# Patient Record
Sex: FEMALE | Race: WHITE | NOT HISPANIC OR LATINO | Employment: FULL TIME | URBAN - METROPOLITAN AREA
[De-identification: names, ages, dates, MRNs, and addresses within clinical notes are randomized per-mention and may not be internally consistent; named-entity substitution may affect disease eponyms.]

---

## 2017-05-10 NOTE — PROGRESS NOTES
"  SUBJECTIVE:                                                    Cornelia Mauricio is a 36 year old female who presents to clinic today for the following health issues:      Back Pain      Duration: flared up in February , initially was just annoying now progressing to muscle spasm, her back \"seizes up\" is very painful her area of pain is directly overlying her fusion is worrisome to the patient        Specific cause: none    Description:   Location of pain: low back center  Character of pain: dull ache, \"seizing up/tight\" and constant  Pain radiation:none  New numbness or weakness in legs, not attributed to pain:  no , no leg symptoms at all    Intensity: Currently 2/10, at worst would rate it 8/10 towards evening    History:   Pain interferes with job: No  History of back problems: YES, had fusion L5-s1 in Sept. 2011  Any previous MRI or X-rays: Yes- at [facility].    Sees a specialist for back pain:  Not currently previously was seeing Dr. Ayala  Therapies tried without relief: working out/stretching, massage chair    Alleviating factors:   Improved by: old Rx of soma helped her sleep , she also admits to have a few drinks each night to relax her so she can sleep     Precipitating factors:  Worsened by: Sitting    Functional and Psychosocial Screen (Blair STarT Back):      Not performed today       Accompanying Signs & Symptoms:  Risk of Fracture:  None  Risk of Cauda Equina:  No Unexplained bowel or bladder changes  Risk of Infection:  None  Risk of Cancer:  None  Risk of Ankylosing Spondylitis:  Onset at age <35, male, AND morning back stiffness. no         She is active, she does yoga and swimming though it's not been helpful. She keeps waiting for this to improve but it's not getting better. She wonders if she should see a spine specialist again or what she should do.     If time would like to discuss hip impingement-had seen PT for this but never got better with it and PT suggested to have this reassessed, she " "wondered if it was related to her back.  She is not certain as her hip impingement has been ongoing for a year and her back is just been more bothersome since February         Problem list and histories reviewed & adjusted, as indicated.  Additional history: as documented    BP Readings from Last 3 Encounters:   05/17/17 98/66   10/14/16 120/60   08/31/16 108/66    Wt Readings from Last 3 Encounters:   05/17/17 148 lb (67.1 kg)   10/14/16 155 lb (70.3 kg)   03/18/16 160 lb (72.6 kg)                  Labs reviewed in EPIC    Reviewed and updated as needed this visit by clinical staff       Reviewed and updated as needed this visit by Provider         ROS:  As above    OBJECTIVE:                                                    BP 98/66  Pulse 72  Temp 98.4  F (36.9  C) (Oral)  Resp 12  Ht 5' 3\" (1.6 m)  Wt 148 lb (67.1 kg)  BMI 26.22 kg/m2  Body mass index is 26.22 kg/(m^2).  GENERAL: healthy, alert and no distress  Comprehensive back pain exam:  No tenderness, Range of motion not limited by pain, Lower extremity strength functional and equal on both sides, Diminished paterllar reflex on  right side relative to contralateral side; possible L4 spinal nerve root involvement and Straight leg raise negative bilaterally    Diagnostic Test Results:  Xray - no acute fractures or issues with hardware noted official report pending     ASSESSMENT/PLAN:                                                            1. Midline low back pain without sciatica, unspecified chronicity  We will have her see spine specialty, she asked for tramadol as this has worked for her in the past. She will use this sparingly after trying ibuprofen without benefit. She will stop using her old stoma. She will stop using alcohol. She knows she may not use alcohol with either meds that I have prescribed or in combination with any prescription medication  - traMADol (ULTRAM) 50 MG tablet; Take 1 tablet (50 mg) by mouth every 6 hours as needed for " pain maximum 2 tablet(s) per day  Dispense: 30 tablet; Refill: 0  - cyclobenzaprine (FLEXERIL) 10 MG tablet; Take 1 tablet (10 mg) by mouth 3 times daily as needed for muscle spasms  Dispense: 30 tablet; Refill: 1  - XR Lumbar Spine 2/3 Views; Future  - NEUROSURGERY REFERRAL        Lyly Zayas PA-C  [facility]  Electronically signed by Lyly Zayas PA-C

## 2017-05-17 ENCOUNTER — RADIANT APPOINTMENT (OUTPATIENT)
Dept: GENERAL RADIOLOGY | Facility: OTHER | Age: 37
End: 2017-05-17
Attending: PHYSICIAN ASSISTANT
Payer: COMMERCIAL

## 2017-05-17 ENCOUNTER — OFFICE VISIT (OUTPATIENT)
Dept: FAMILY MEDICINE | Facility: OTHER | Age: 37
End: 2017-05-17
Payer: COMMERCIAL

## 2017-05-17 VITALS
RESPIRATION RATE: 12 BRPM | DIASTOLIC BLOOD PRESSURE: 66 MMHG | TEMPERATURE: 98.4 F | WEIGHT: 148 LBS | HEART RATE: 72 BPM | HEIGHT: 63 IN | SYSTOLIC BLOOD PRESSURE: 98 MMHG | BODY MASS INDEX: 26.22 KG/M2

## 2017-05-17 DIAGNOSIS — M54.50 MIDLINE LOW BACK PAIN WITHOUT SCIATICA, UNSPECIFIED CHRONICITY: ICD-10-CM

## 2017-05-17 DIAGNOSIS — M54.50 MIDLINE LOW BACK PAIN WITHOUT SCIATICA, UNSPECIFIED CHRONICITY: Primary | ICD-10-CM

## 2017-05-17 PROCEDURE — 72100 X-RAY EXAM L-S SPINE 2/3 VWS: CPT

## 2017-05-17 PROCEDURE — 99213 OFFICE O/P EST LOW 20 MIN: CPT | Performed by: PHYSICIAN ASSISTANT

## 2017-05-17 RX ORDER — CYCLOBENZAPRINE HCL 10 MG
10 TABLET ORAL 3 TIMES DAILY PRN
Qty: 30 TABLET | Refills: 1 | Status: SHIPPED | OUTPATIENT
Start: 2017-05-17 | End: 2018-06-27

## 2017-05-17 RX ORDER — TRAMADOL HYDROCHLORIDE 50 MG/1
50 TABLET ORAL EVERY 6 HOURS PRN
Qty: 30 TABLET | Refills: 0 | Status: SHIPPED | OUTPATIENT
Start: 2017-05-17 | End: 2018-06-27

## 2017-05-17 ASSESSMENT — PAIN SCALES - GENERAL: PAINLEVEL: MILD PAIN (2)

## 2017-05-17 NOTE — NURSING NOTE
"Chief Complaint   Patient presents with     Back Pain     Panel Management     utd        Initial BP 98/66  Pulse 72  Temp 98.4  F (36.9  C) (Oral)  Resp 12  Ht 5' 3\" (1.6 m)  Wt 148 lb (67.1 kg)  BMI 26.22 kg/m2 Estimated body mass index is 26.22 kg/(m^2) as calculated from the following:    Height as of this encounter: 5' 3\" (1.6 m).    Weight as of this encounter: 148 lb (67.1 kg).  Medication Reconciliation: complete     Kiara Dorsey CMA (AAMA)      "

## 2017-05-17 NOTE — MR AVS SNAPSHOT
After Visit Summary   5/17/2017    Cornelia Mauricio    MRN: [id number]           Patient Information     Date Of Birth          1980        Visit Information        Provider Department      5/17/2017 2:45 PM Lyly Zayas PA-C [facility] [place]        Today's Diagnoses     Midline low back pain without sciatica, unspecified chronicity    -  1       Follow-ups after your visit        Additional Services     NEUROSURGERY REFERRAL       Your provider has referred you to: [facility]: [facility] -  Neurosurgery Clinic [phone]   [email or web address]    Please be aware that coverage of these services is subject to the terms and limitations of your health insurance plan.  Call member services at your health plan with any benefit or coverage questions.      Please bring the following with you to your appointment:    (1) Any X-Rays, CTs or MRIs which have been performed.  Contact the facility where they were done to arrange for  prior to your scheduled appointment.   (2) List of current medications  (3) This referral request   (4) Any documents/labs given to you for this referral                  Who to contact     If you have questions or need follow up information about today's clinic visit or your schedule please contact [facility] directly at [phone].  Normal or non-critical lab and imaging results will be communicated to you by MyChart, letter or phone within 4 business days after the clinic has received the results. If you do not hear from us within 7 days, please contact the clinic through MyChart or phone. If you have a critical or abnormal lab result, we will notify you by phone as soon as possible.  Submit refill requests through [employer] or call your pharmacy and they will forward the refill request to us. Please allow 3 business days for your refill to be completed.          Additional Information About Your  "Visit        [employer] Information     [employer] gives you secure access to your electronic health record. If you see a primary care provider, you can also send messages to your care team and make appointments. If you have questions, please call your primary care clinic.  If you do not have a primary care provider, please call [phone] and they will assist you.        Care EveryWhere ID     This is your Care EveryWhere ID. This could be used by other organizations to access your [facility] medical records  [id number]        Your Vitals Were     Pulse Temperature Respirations Height BMI (Body Mass Index)       72 98.4  F (36.9  C) (Oral) 12 5' 3\" (1.6 m) 26.22 kg/m2        Blood Pressure from Last 3 Encounters:   05/17/17 98/66   10/14/16 120/60   08/31/16 108/66    Weight from Last 3 Encounters:   05/17/17 148 lb (67.1 kg)   10/14/16 155 lb (70.3 kg)   03/18/16 160 lb (72.6 kg)              We Performed the Following     NEUROSURGERY REFERRAL          Today's Medication Changes          These changes are accurate as of: 5/17/17  3:27 PM.  If you have any questions, ask your nurse or doctor.               Start taking these medicines.        Dose/Directions    cyclobenzaprine 10 MG tablet   Commonly known as:  FLEXERIL   Used for:  Midline low back pain without sciatica, unspecified chronicity   Started by:  Lyly Zayas PA-C        Dose:  10 mg   Take 1 tablet (10 mg) by mouth 3 times daily as needed for muscle spasms   Quantity:  30 tablet   Refills:  1       traMADol 50 MG tablet   Commonly known as:  ULTRAM   Used for:  Midline low back pain without sciatica, unspecified chronicity   Started by:  Lyly Zayas PA-C        Dose:  50 mg   Take 1 tablet (50 mg) by mouth every 6 hours as needed for pain maximum 2 tablet(s) per day   Quantity:  30 tablet   Refills:  0            Where to get your medicines      These medications were sent to [facility] [address] [place] [address] [place] - [address]  [address], " [place] [address]     Phone:  [phone]     cyclobenzaprine 10 MG tablet         Some of these will need a paper prescription and others can be bought over the counter.  Ask your nurse if you have questions.     Bring a paper prescription for each of these medications     traMADol 50 MG tablet                Primary Care Provider Office Phone # Fax #    Lyly Zayas PA-C [phone]       [facility] [address] [place] [address]        Thank you!     Thank you for choosing [facility]  for your care. Our goal is always to provide you with excellent care. Hearing back from our patients is one way we can continue to improve our services. Please take a few minutes to complete the written survey that you may receive in the mail after your visit with us. Thank you!             Your Updated Medication List - Protect others around you: Learn how to safely use, store and throw away your medicines at www.disposemymeds.org.          This list is accurate as of: 5/17/17  3:27 PM.  Always use your most recent med list.                   Brand Name Dispense Instructions for use    cyclobenzaprine 10 MG tablet    FLEXERIL    30 tablet    Take 1 tablet (10 mg) by mouth 3 times daily as needed for muscle spasms       glucosamine 500 MG Caps      Take 1 tablet by mouth daily       KRILL OIL PO      Take 1 capsule by mouth daily       meloxicam 15 MG tablet    MOBIC    30 tablet    Take 1 tablet (15 mg) by mouth daily       MULTIVITAMIN ADULT PO          traMADol 50 MG tablet    ULTRAM    30 tablet    Take 1 tablet (50 mg) by mouth every 6 hours as needed for pain maximum 2 tablet(s) per day       triamcinolone 0.1 % cream    KENALOG    30 g    aaa bid prn

## 2017-06-15 ENCOUNTER — OFFICE VISIT (OUTPATIENT)
Dept: NEUROSURGERY | Facility: CLINIC | Age: 37
End: 2017-06-15
Attending: PHYSICIAN ASSISTANT
Payer: COMMERCIAL

## 2017-06-15 VITALS — WEIGHT: 149 LBS | TEMPERATURE: 97.8 F | BODY MASS INDEX: 26.4 KG/M2 | HEIGHT: 63 IN

## 2017-06-15 DIAGNOSIS — G89.29 CHRONIC MIDLINE LOW BACK PAIN WITHOUT SCIATICA: Primary | ICD-10-CM

## 2017-06-15 DIAGNOSIS — M54.50 CHRONIC MIDLINE LOW BACK PAIN WITHOUT SCIATICA: Primary | ICD-10-CM

## 2017-06-15 PROCEDURE — 99204 OFFICE O/P NEW MOD 45 MIN: CPT | Performed by: PHYSICIAN ASSISTANT

## 2017-06-15 ASSESSMENT — PAIN SCALES - GENERAL: PAINLEVEL: NO PAIN (1)

## 2017-06-15 NOTE — MR AVS SNAPSHOT
After Visit Summary   6/15/2017    Cornelia Mauricio    MRN: [id number]           Patient Information     Date Of Birth          1980        Visit Information        Provider Department      6/15/2017 3:00 PM Jan Pimentel PA-C [facility]        Today's Diagnoses     Chronic midline low back pain without sciatica    -  1       Follow-ups after your visit        Follow-up notes from your care team     Return for after MRI.      Future tests that were ordered for you today     Open Future Orders        Priority Expected Expires Ordered    MR Lumbar Spine w/o & w Contrast Routine  6/15/2018 6/15/2017            Who to contact     If you have questions or need follow up information about today's clinic visit or your schedule please contact [facility] directly at [phone].  Normal or non-critical lab and imaging results will be communicated to you by MyChart, letter or phone within 4 business days after the clinic has received the results. If you do not hear from us within 7 days, please contact the clinic through MyChart or phone. If you have a critical or abnormal lab result, we will notify you by phone as soon as possible.  Submit refill requests through [employer] or call your pharmacy and they will forward the refill request to us. Please allow 3 business days for your refill to be completed.          Additional Information About Your Visit        MyChart Information     [employer] gives you secure access to your electronic health record. If you see a primary care provider, you can also send messages to your care team and make appointments. If you have questions, please call your primary care clinic.  If you do not have a primary care provider, please call [phone] and they will assist you.        Care EveryWhere ID     This is your Care EveryWhere ID. This could be used by other organizations to access your [facility] medical records  [id number]        Your  "Vitals Were     Temperature Height BMI (Body Mass Index)             97.8  F (36.6  C) (Temporal) 1.6 m (5' 3\") 26.39 kg/m2          Blood Pressure from Last 3 Encounters:   05/17/17 98/66   10/14/16 120/60   08/31/16 108/66    Weight from Last 3 Encounters:   06/15/17 67.6 kg (149 lb)   05/17/17 67.1 kg (148 lb)   10/14/16 70.3 kg (155 lb)               Primary Care Provider Office Phone # Fax #    Lyly Zayas PA-C [phone]       [facility] [address] [place] [address]        Thank you!     Thank you for choosing [facility]  for your care. Our goal is always to provide you with excellent care. Hearing back from our patients is one way we can continue to improve our services. Please take a few minutes to complete the written survey that you may receive in the mail after your visit with us. Thank you!             Your Updated Medication List - Protect others around you: Learn how to safely use, store and throw away your medicines at www.disposemymeds.org.          This list is accurate as of: 6/15/17  4:01 PM.  Always use your most recent med list.                   Brand Name Dispense Instructions for use    cyclobenzaprine 10 MG tablet    FLEXERIL    30 tablet    Take 1 tablet (10 mg) by mouth 3 times daily as needed for muscle spasms       glucosamine 500 MG Caps      Take 1 tablet by mouth daily       KRILL OIL PO      Take 1 capsule by mouth daily       meloxicam 15 MG tablet    MOBIC    30 tablet    Take 1 tablet (15 mg) by mouth daily       MULTIVITAMIN ADULT PO          traMADol 50 MG tablet    ULTRAM    30 tablet    Take 1 tablet (50 mg) by mouth every 6 hours as needed for pain maximum 2 tablet(s) per day       triamcinolone 0.1 % cream    KENALOG    30 g    aaa bid prn         "

## 2017-06-15 NOTE — PROGRESS NOTES
"Cornelia Mauricio is a 36 year old female who presents for:  Chief Complaint   Patient presents with     Consult     Mid to low back pain        Initial Vitals:  Temp 97.8  F (36.6  C) (Temporal)  Ht 1.6 m (5' 3\")  Wt 67.6 kg (149 lb)  BMI 26.39 kg/m2 Estimated body mass index is 26.39 kg/(m^2) as calculated from the following:    Height as of this encounter: 1.6 m (5' 3\").    Weight as of this encounter: 67.6 kg (149 lb).. Body surface area is 1.73 meters squared. BP completed using cuff size: NA (Not Taken)  No Pain (1)    Do you feel safe in your environment?  Yes  Do you need any refills today? No    Nursing Comments:         Gloria Melendez CMA ([facility])    "

## 2017-06-15 NOTE — PROGRESS NOTES
Dr. Ethan Viramontes  [facility]  Neurosurgery Clinic Visit      CC: Back pain    Primary care Provider: Lyly Zayas      Reason For Visit:   I was asked to consult on the patient for back pain.      HPI: Cornelia Mauricio is a 36 year old female who presents for evaluation of her chief complaint of low back pain. She did undergo an L5-S1 anterior interbody fusion in . She initially did very well after this. However, she began developing midline low back pain several months ago, just at the superior aspect of her incision. She denies any radicular pain or paresthesias. She denies any bowel or bladder changes.    Past Medical History:   Diagnosis Date     Depressive disorder      Esophageal reflux      Suicide and self-inflicted poisoning by drug or medicinal substance (H)     tylenol OD age 17 or 18     Vaginismus        Past Medical History reviewed with patient during visit.    Past Surgical History:   Procedure Laterality Date     ABDOMEN SURGERY   &     . Spinal surgery     BACK SURGERY       C  DELIVERY ONLY      , Low Cervical     ESOPHAGOSCOPY, GASTROSCOPY, DUODENOSCOPY (EGD), COMBINED  2011    Procedure:COMBINED ESOPHAGOSCOPY, GASTROSCOPY, DUODENOSCOPY (EGD), BIOPSY SINGLE OR MULTIPLE; multiple; Surgeon:JEFFRY BE; Location:[facility]     Past Surgical History reviewed with patient during visit.    Current Outpatient Prescriptions   Medication     traMADol (ULTRAM) 50 MG tablet     cyclobenzaprine (FLEXERIL) 10 MG tablet     Multiple Vitamins-Minerals (MULTIVITAMIN ADULT PO)     meloxicam (MOBIC) 15 MG tablet     glucosamine 500 MG CAPS     KRILL OIL PO     triamcinolone (KENALOG) 0.1 % cream     No current facility-administered medications for this visit.        Allergies   Allergen Reactions     No Known Drug Allergies        Social History     Social History     Marital status:      Spouse name: N/A     Number of children: N/A      "Years of education: N/A     Social History Main Topics     Smoking status: Never Smoker     Smokeless tobacco: Never Used     Alcohol use Yes     Drug use: No     Sexual activity: Yes     Partners: Male     Birth control/ protection: Male Surgical      Comment:  has vas     Other Topics Concern     Parent/Sibling W/ Cabg, Mi Or Angioplasty Before 65f 55m? No     Social History Narrative       Family History   Problem Relation Age of Onset     Obesity Mother      GASTROINTESTINAL DISEASE Mother      Gallbladder     Obesity Sister      Psychotic Disorder Sister      Multi personality, bipolar, suicidal, compulsive, anixety     Depression Sister      GASTROINTESTINAL DISEASE Sister      gallbladder     DIABETES Father      Alcohol/Drug Father      alcohol     Depression Father      Breast Cancer Paternal Grandmother       age 70, unknown why     Eye Disorder Maternal Grandmother      macular degeneration     Depression Maternal Grandmother      Gynecology Maternal Grandmother      Hysterectomy - Tumors, cysts     GASTROINTESTINAL DISEASE Maternal Grandmother      Gallbladder     C.A.D. Maternal Grandfather      Cardiovascular Maternal Grandfather      HEART DISEASE Maternal Grandfather      MI     Genitourinary Problems Paternal Grandfather      Kidney Failure     CANCER Paternal Grandfather      Neurologic Disorder Daughter      adhd     Musculoskeletal Disorder Paternal Aunt      fibromyalgias          ROS: 10 point ROS neg other than the symptoms noted above in the HPI.    Vital Signs: Temp 97.8  F (36.6  C) (Temporal)  Ht 1.6 m (5' 3\")  Wt 67.6 kg (149 lb)  BMI 26.39 kg/m2    Examination:  Constitutional:  Alert, well nourished, NAD.  HEENT: Normocephalic, atraumatic.   Pulmonary:  Without shortness of breath, normal effort.   Lymph: no lymphadenopathy to low back or LE.   Integumentary: Skin is free of rashes or lesions.   Cardiovascular:  No pitting edema of BLE.  "     Neurological:  Awake  Alert  Oriented x 3  Speech clear  Cranial nerves II - XII grossly intact  PERRL  EOMI  Face symmetric  Tongue midline  Motor exam   Shoulder Abduction:  Right:  5/5   Left:  5/5  Biceps:                      Right:  5/5   Left:  5/5  Triceps:                     Right:  5/5   Left:  5/5  Wrist Extensors:       Right:  5/5   Left:  5/5  Wrist Flexors:           Right:  5/5   Left:  5/5  Intrinsics:                   Right:  5/5   Left:  5/5  Hip Flexor:                Right: 5/5  Left:  5/5  Hip Adductor:             Right:  5/5  Left:  5/5  Hip Abductor:             Right:  5/5  Left:  5/5  Gastroc Soleus:        Right:  5/5  Left:  5/5  Tib/Ant:                      Right:  5/5  Left:  5/5  EHL:                          Right:  5/5  Left:  5/5       Sensation normal to bilateral upper and lower extremities.    Reflexes are 2+ in the patellar and Achilles. There is no clonus. Downgoing Babinski.    Musculoskeletal:  Gait: Able to stand from a seated position. Normal non-antalgic, non-myelopathic gait.  Able to heel/toe walk without loss of balance    Lumbar examination reveals no tenderness of the spine or paraspinous muscles.  Hip height is symmetrical. Negative SI joint, sciatic notch or greater trochanteric tenderness to palpation bilaterally.  Straight leg raise is negative bilaterally.      Imaging:   None    Assessment/Plan:    Low back pain  Prior L5-S1 anterior fusion in 2011 with [place] [facility]      Cornelia Mauricio is a 36 year old female. I did have a discussion with the patient regarding her symptoms. She has developed midline low back pain, just superior to her old incision. She did have an anterior fusion at L5-S1 6 years ago. She does have x-rays that were done about 1 month ago, which show stable hardware. We will obtain a lumbar spine MRI to further evaluate.          Jan Pimentel PA-C  [facility]  [address]  [address]  [place] [address]    Tel [phone]  Pager [phone]

## 2017-06-21 ENCOUNTER — HOSPITAL ENCOUNTER (OUTPATIENT)
Dept: MRI IMAGING | Facility: CLINIC | Age: 37
Discharge: HOME | End: 2017-06-21
Attending: PHYSICIAN ASSISTANT | Admitting: PHYSICIAN ASSISTANT
Payer: COMMERCIAL

## 2017-06-21 DIAGNOSIS — G89.29 CHRONIC MIDLINE LOW BACK PAIN WITHOUT SCIATICA: ICD-10-CM

## 2017-06-21 DIAGNOSIS — M54.50 CHRONIC MIDLINE LOW BACK PAIN WITHOUT SCIATICA: ICD-10-CM

## 2017-06-21 PROCEDURE — 72158 MRI LUMBAR SPINE W/O & W/DYE: CPT

## 2017-06-21 PROCEDURE — 25000128 H RX IP 250 OP 636: Performed by: RADIOLOGY

## 2017-06-21 PROCEDURE — A9585 GADOBUTROL INJECTION: HCPCS | Performed by: RADIOLOGY

## 2017-06-21 RX ORDER — GADOBUTROL 604.72 MG/ML
7.5 INJECTION INTRAVENOUS ONCE
Status: COMPLETED | OUTPATIENT
Start: 2017-06-21 | End: 2017-06-21

## 2017-06-21 RX ADMIN — GADOBUTROL 7.5 ML: 604.72 INJECTION INTRAVENOUS at 16:36

## 2017-08-09 ENCOUNTER — MYC MEDICAL ADVICE (OUTPATIENT)
Dept: NEUROSURGERY | Facility: CLINIC | Age: 37
End: 2017-08-09

## 2017-08-09 DIAGNOSIS — M54.50 CHRONIC MIDLINE LOW BACK PAIN WITHOUT SCIATICA: Primary | ICD-10-CM

## 2017-08-09 DIAGNOSIS — G89.29 CHRONIC MIDLINE LOW BACK PAIN WITHOUT SCIATICA: Primary | ICD-10-CM

## 2017-08-09 NOTE — TELEPHONE ENCOUNTER
Per Jan Pimentel PA-C, patient's lumbar MRI looks good. No concern with prior L5-S1 fusion or stenosis. He recc PT and pain mgmt referral. Spoke with patient, and she'd like to proceed with PT first. She wants the referral mailed to her home address because her job offers PT and she can take the order there. She will call back if she decides to proceed with a pain mgmt clinic. Advised patient to contact our clinic with any questions or concerns.

## 2018-04-12 ENCOUNTER — OFFICE VISIT (OUTPATIENT)
Dept: ORTHOPEDICS | Facility: OTHER | Age: 38
End: 2018-04-12
Payer: COMMERCIAL

## 2018-04-12 ENCOUNTER — OFFICE VISIT (OUTPATIENT)
Dept: OBGYN | Facility: OTHER | Age: 38
End: 2018-04-12
Payer: COMMERCIAL

## 2018-04-12 ENCOUNTER — RADIANT APPOINTMENT (OUTPATIENT)
Dept: GENERAL RADIOLOGY | Facility: OTHER | Age: 38
End: 2018-04-12
Attending: ORTHOPAEDIC SURGERY
Payer: COMMERCIAL

## 2018-04-12 VITALS
WEIGHT: 158.25 LBS | DIASTOLIC BLOOD PRESSURE: 74 MMHG | HEART RATE: 80 BPM | SYSTOLIC BLOOD PRESSURE: 114 MMHG | BODY MASS INDEX: 28.03 KG/M2

## 2018-04-12 VITALS
DIASTOLIC BLOOD PRESSURE: 60 MMHG | BODY MASS INDEX: 27.29 KG/M2 | SYSTOLIC BLOOD PRESSURE: 110 MMHG | TEMPERATURE: 97.8 F | WEIGHT: 154 LBS | HEIGHT: 63 IN

## 2018-04-12 DIAGNOSIS — M25.552 BILATERAL HIP PAIN: ICD-10-CM

## 2018-04-12 DIAGNOSIS — M25.851 HIP IMPINGEMENT SYNDROME, RIGHT: ICD-10-CM

## 2018-04-12 DIAGNOSIS — Z30.013 ENCOUNTER FOR INITIAL PRESCRIPTION OF INJECTABLE CONTRACEPTIVE: Primary | ICD-10-CM

## 2018-04-12 DIAGNOSIS — Z30.09 CONTRACEPTIVE EDUCATION: ICD-10-CM

## 2018-04-12 DIAGNOSIS — M25.852 HIP IMPINGEMENT SYNDROME, LEFT: Primary | ICD-10-CM

## 2018-04-12 DIAGNOSIS — M25.551 BILATERAL HIP PAIN: ICD-10-CM

## 2018-04-12 PROCEDURE — 73523 X-RAY EXAM HIPS BI 5/> VIEWS: CPT

## 2018-04-12 PROCEDURE — 99213 OFFICE O/P EST LOW 20 MIN: CPT | Performed by: ORTHOPAEDIC SURGERY

## 2018-04-12 PROCEDURE — 99203 OFFICE O/P NEW LOW 30 MIN: CPT | Mod: 25 | Performed by: ADVANCED PRACTICE MIDWIFE

## 2018-04-12 PROCEDURE — 96372 THER/PROPH/DIAG INJ SC/IM: CPT | Performed by: ADVANCED PRACTICE MIDWIFE

## 2018-04-12 RX ORDER — MEDROXYPROGESTERONE ACETATE 150 MG/ML
150 INJECTION, SUSPENSION INTRAMUSCULAR
Qty: 3 ML | Refills: 3 | OUTPATIENT
Start: 2018-04-12 | End: 2018-06-27

## 2018-04-12 ASSESSMENT — PAIN SCALES - GENERAL: PAINLEVEL: MILD PAIN (3)

## 2018-04-12 NOTE — LETTER
"    4/12/2018         RE: Cornelia Mauricio  [address]  [place] [address]        Dear Colleague,    Thank you for referring your patient, Cornelia Mauricio, to the [facility]. Please see a copy of my visit note below.    Office Visit-Follow up    Chief Complaint: Cornelia Mauricio is a 37 year old female who is being seen for   Chief Complaint   Patient presents with     Musculoskeletal Problem     bilateral hip pain       History of Present Illness:   Presents with about 2 years of groin pain.  On and off.  She was active with exercise.  She was doing a lunge when she felt that deep groin pain in her left hip initially.  She rested and slightly improved.  However then the pain came to her right hip.  It has been on and off however more constant as of late.  She is attempted rest, activity modifications.  She is done over 3 months worth of formal physical therapy with no significant improvement.  She has pain with walking and on elliptical.      REVIEW OF SYSTEMS  General: negative for, night sweats, dizziness, fatigue  Resp: No shortness of breath and no cough  CV: negative for chest pain, syncope or near-syncope  GI: negative for nausea, vomiting and diarrhea  : negative for dysuria and hematuria  Musculoskeletal: as above  Neurologic: negative for syncope   Hematologic: negative for bleeding disorder    Physical Exam:  Vitals: /60 (BP Location: Left arm, Patient Position: Sitting, Cuff Size: Adult Large)  Temp 97.8  F (36.6  C) (Temporal)  Ht 5' 3\" (1.6 m)  Wt 154 lb (69.9 kg)  LMP 03/26/2018 (Exact Date)  BMI 27.28 kg/m2  BMI= Body mass index is 27.28 kg/(m^2).  Constitutional: healthy, alert and no acute distress   Psychiatric: mentation appears normal and affect normal/bright  NEURO: no focal deficits  RESP: Normal with easy respirations and no use of accessory muscles to breathe, no audible wheezing or retractions  CV: bilateral lower extremity:   no edema         SKIN: No " erythema, rashes, excoriation, or breakdown. No evidence of infection.   JOINT/EXTREMITIES:bilateral hips: No focal areas of tenderness.  She has full active range of motion.  No significant weakness.  Well-developed musculature.  No gross deformity.  Negative straight leg.  Negative Cecil.  Positive impingement finding with flexion internal rotation.  Negative Pam.  No significant hamstring tightness.  GAIT: non-antalgic            Diagnostic Modalities:  bilateral hip X-ray: No fractures or dislocations.  No significant arthritis.  Previous lumbar spine instrumentation.  Slight cystic change on the femoral head neck junction.  Independent visualization of the images was performed.      Impression: bilateral hips femoral acetabular impingement    Plan:  All of the above pertinent physical exam and imaging modalities findings was reviewed with Cornelia.    Options discussed.  At this point I recommended attempting some conservative care including intra-articular steroid injections under fluoroscopy by radiology.  I reviewed her pathology.  Recommend she keep her activity to low impact.  Continue physical therapy exercises.  If this fails to provide her relief I would recommend bilateral hip MRI arthrograms for full evaluation of her labral tissue.        Return to clinic PRN, or sooner as needed for changes.  Re-x-ray on return: No    Nehemiah Hoffman D.O.          Again, thank you for allowing me to participate in the care of your patient.        Sincerely,        Leo Hoffman, DO

## 2018-04-12 NOTE — NURSING NOTE
"Chief Complaint   Patient presents with     Musculoskeletal Problem     bilateral hip pain       Initial /60 (BP Location: Left arm, Patient Position: Sitting, Cuff Size: Adult Large)  Temp 97.8  F (36.6  C) (Temporal)  Ht 1.6 m (5' 3\")  Wt 69.9 kg (154 lb)  LMP 03/26/2018 (Exact Date)  BMI 27.28 kg/m2 Estimated body mass index is 27.28 kg/(m^2) as calculated from the following:    Height as of this encounter: 1.6 m (5' 3\").    Weight as of this encounter: 69.9 kg (154 lb).  Medication Reconciliation: complete    "

## 2018-04-12 NOTE — MR AVS SNAPSHOT
After Visit Summary   4/12/2018    Cornelia Mauricio    MRN: [id number]           Patient Information     Date Of Birth          1980        Visit Information        Provider Department      4/12/2018 3:20 PM Leo Hoffman DO [facility]        Today's Diagnoses     Hip impingement syndrome, left    -  1    Hip impingement syndrome, right           Follow-ups after your visit        Future tests that were ordered for you today     Open Future Orders        Priority Expected Expires Ordered    XR Joint Injection Major Bilateral Routine 4/12/2018 4/12/2019 4/12/2018            Who to contact     If you have questions or need follow up information about today's clinic visit or your schedule please contact [facility] directly at [phone].  Normal or non-critical lab and imaging results will be communicated to you by [employer], letter or phone within 4 business days after the clinic has received the results. If you do not hear from us within 7 days, please contact the clinic through [employer] or phone. If you have a critical or abnormal lab result, we will notify you by phone as soon as possible.  Submit refill requests through [employer] or call your pharmacy and they will forward the refill request to us. Please allow 3 business days for your refill to be completed.          Additional Information About Your Visit        MyChart Information     [employer] gives you secure access to your electronic health record. If you see a primary care provider, you can also send messages to your care team and make appointments. If you have questions, please call your primary care clinic.  If you do not have a primary care provider, please call [phone] and they will assist you.        Care EveryWhere ID     This is your Care EveryWhere ID. This could be used by other organizations to access your [place] medical records  [id number]        Your Vitals Were     Temperature  "Height Last Period BMI (Body Mass Index)          97.8  F (36.6  C) (Temporal) 5' 3\" (1.6 m) 03/26/2018 (Exact Date) 27.28 kg/m2         Blood Pressure from Last 3 Encounters:   04/12/18 110/60   04/12/18 114/74   05/17/17 98/66    Weight from Last 3 Encounters:   04/12/18 154 lb (69.9 kg)   04/12/18 158 lb 4 oz (71.8 kg)   06/15/17 149 lb (67.6 kg)                 Today's Medication Changes          These changes are accurate as of 4/12/18  5:06 PM.  If you have any questions, ask your nurse or doctor.               Start taking these medicines.        Dose/Directions    medroxyPROGESTERone 150 MG/ML injection   Commonly known as:  DEPO-PROVERA   Used for:  Encounter for initial prescription of injectable contraceptive, Contraceptive education   Started by:  Gladis Ferris, [place] CNM        Dose:  150 mg   Inject 1 mL (150 mg) into the muscle every 3 months   Quantity:  3 mL   Refills:  3            Where to get your medicines      Some of these will need a paper prescription and others can be bought over the counter.  Ask your nurse if you have questions.     You don't need a prescription for these medications     medroxyPROGESTERone 150 MG/ML injection                Primary Care Provider Office Phone # Fax #    Lyly Zayas PA-C [phone] [address] [place] [address]        Equal Access to Services     [facility] AH: Hadii clint liang hadasho Sogary, waaxda luqadaha, qaybta kaalmada cailinyada, waxay mao frank. So [facility] [phone].    ATENCIÓN: Si habla español, tiene a valladares disposición servicios gratuitos de asistencia lingüística. Llame al [phone].    We comply with applicable federal civil rights laws and [place] laws. We do not discriminate on the basis of race, color, national origin, age, disability, sex, sexual orientation, or gender identity.            Thank you!     Thank you for choosing [facility]  for your care. Our goal " is always to provide you with excellent care. Hearing back from our patients is one way we can continue to improve our services. Please take a few minutes to complete the written survey that you may receive in the mail after your visit with us. Thank you!             Your Updated Medication List - Protect others around you: Learn how to safely use, store and throw away your medicines at www.disposemymeds.org.          This list is accurate as of 4/12/18  5:06 PM.  Always use your most recent med list.                   Brand Name Dispense Instructions for use Diagnosis    cyclobenzaprine 10 MG tablet    FLEXERIL    30 tablet    Take 1 tablet (10 mg) by mouth 3 times daily as needed for muscle spasms    Midline low back pain without sciatica, unspecified chronicity       glucosamine 500 MG Caps      Take 1 tablet by mouth daily        KRILL OIL PO      Take 1 capsule by mouth daily        medroxyPROGESTERone 150 MG/ML injection    DEPO-PROVERA    3 mL    Inject 1 mL (150 mg) into the muscle every 3 months    Encounter for initial prescription of injectable contraceptive, Contraceptive education       meloxicam 15 MG tablet    MOBIC    30 tablet    Take 1 tablet (15 mg) by mouth daily    Strain of left hip adductor muscle, initial encounter       MULTIVITAMIN ADULT PO           traMADol 50 MG tablet    ULTRAM    30 tablet    Take 1 tablet (50 mg) by mouth every 6 hours as needed for pain maximum 2 tablet(s) per day    Midline low back pain without sciatica, unspecified chronicity       triamcinolone 0.1 % cream    KENALOG    30 g    aaa bid prn    Eczema

## 2018-04-12 NOTE — PROGRESS NOTES
"Office Visit-Follow up    Chief Complaint: Cornelia Mauricio is a 37 year old female who is being seen for   Chief Complaint   Patient presents with     Musculoskeletal Problem     bilateral hip pain       History of Present Illness:   Presents with about 2 years of groin pain.  On and off.  She was active with exercise.  She was doing a lunge when she felt that deep groin pain in her left hip initially.  She rested and slightly improved.  However then the pain came to her right hip.  It has been on and off however more constant as of late.  She is attempted rest, activity modifications.  She is done over 3 months worth of formal physical therapy with no significant improvement.  She has pain with walking and on elliptical.      REVIEW OF SYSTEMS  General: negative for, night sweats, dizziness, fatigue  Resp: No shortness of breath and no cough  CV: negative for chest pain, syncope or near-syncope  GI: negative for nausea, vomiting and diarrhea  : negative for dysuria and hematuria  Musculoskeletal: as above  Neurologic: negative for syncope   Hematologic: negative for bleeding disorder    Physical Exam:  Vitals: /60 (BP Location: Left arm, Patient Position: Sitting, Cuff Size: Adult Large)  Temp 97.8  F (36.6  C) (Temporal)  Ht 5' 3\" (1.6 m)  Wt 154 lb (69.9 kg)  LMP 03/26/2018 (Exact Date)  BMI 27.28 kg/m2  BMI= Body mass index is 27.28 kg/(m^2).  Constitutional: healthy, alert and no acute distress   Psychiatric: mentation appears normal and affect normal/bright  NEURO: no focal deficits  RESP: Normal with easy respirations and no use of accessory muscles to breathe, no audible wheezing or retractions  CV: bilateral lower extremity:   no edema         SKIN: No erythema, rashes, excoriation, or breakdown. No evidence of infection.   JOINT/EXTREMITIES:bilateral hips: No focal areas of tenderness.  She has full active range of motion.  No significant weakness.  Well-developed musculature.  No gross " deformity.  Negative straight leg.  Negative Cecil.  Positive impingement finding with flexion internal rotation.  Negative Pam.  No significant hamstring tightness.  GAIT: non-antalgic            Diagnostic Modalities:  bilateral hip X-ray: No fractures or dislocations.  No significant arthritis.  Previous lumbar spine instrumentation.  Slight cystic change on the femoral head neck junction.  Independent visualization of the images was performed.      Impression: bilateral hips femoral acetabular impingement    Plan:  All of the above pertinent physical exam and imaging modalities findings was reviewed with Cornelia.    Options discussed.  At this point I recommended attempting some conservative care including intra-articular steroid injections under fluoroscopy by radiology.  I reviewed her pathology.  Recommend she keep her activity to low impact.  Continue physical therapy exercises.  If this fails to provide her relief I would recommend bilateral hip MRI arthrograms for full evaluation of her labral tissue.        Return to clinic PRN, or sooner as needed for changes.  Re-x-ray on return: No    Nehemiah Hoffman D.O.

## 2018-04-12 NOTE — PROGRESS NOTES
Cornelia Mauricio is a 37 year old who presents to the clinic for discussion of birth control methods.   She has used the following methods in the past: CECILIA, Mirena IUD, Depo Provera and condoms  Today she is interested in discussing Depo Provera.  Has been using condoms 100% with no breaks/leaks  Histories reviewed and updated  Past Medical History:   Diagnosis Date     Depressive disorder      Esophageal reflux      Suicide and self-inflicted poisoning by drug or medicinal substance (H)     tylenol OD age 17 or 18     Vaginismus      Past Surgical History:   Procedure Laterality Date     ABDOMEN SURGERY   &     . Spinal surgery     BACK SURGERY       C  DELIVERY ONLY      , Low Cervical     ESOPHAGOSCOPY, GASTROSCOPY, DUODENOSCOPY (EGD), COMBINED  2011    Procedure:COMBINED ESOPHAGOSCOPY, GASTROSCOPY, DUODENOSCOPY (EGD), BIOPSY SINGLE OR MULTIPLE; multiple; Surgeon:JEFFRY BE; Location: GI     Social History     Social History     Marital status:      Spouse name: N/A     Number of children: N/A     Years of education: N/A     Occupational History     Not on file.     Social History Main Topics     Smoking status: Never Smoker     Smokeless tobacco: Never Used     Alcohol use Yes     Drug use: No     Sexual activity: Yes     Partners: Male     Birth control/ protection: Condom, Male Surgical      Comment:  has vas     Other Topics Concern     Parent/Sibling W/ Cabg, Mi Or Angioplasty Before 65f 55m? No     Social History Narrative     Family History   Problem Relation Age of Onset     Obesity Mother      GASTROINTESTINAL DISEASE Mother      Gallbladder     Obesity Sister      Psychotic Disorder Sister      Multi personality, bipolar, suicidal, compulsive, anixety     Depression Sister      GASTROINTESTINAL DISEASE Sister      gallbladder     DIABETES Father      Alcohol/Drug Father      alcohol     Depression Father      Breast Cancer Paternal  Grandmother       age 70, unknown why     Eye Disorder Maternal Grandmother      macular degeneration     Depression Maternal Grandmother      Gynecology Maternal Grandmother      Hysterectomy - Tumors, cysts     GASTROINTESTINAL DISEASE Maternal Grandmother      Gallbladder     C.A.D. Maternal Grandfather      Cardiovascular Maternal Grandfather      HEART DISEASE Maternal Grandfather      MI     Genitourinary Problems Paternal Grandfather      Kidney Failure     CANCER Paternal Grandfather      Neurologic Disorder Daughter      adhd     Musculoskeletal Disorder Paternal Aunt      fibromyalgias       Menstrual History    Menses every 28 days.  Length of menses: 2 days  Menstrual description: light    Denies the following contraindications to OCP use:  Migraine and migraine with aura  Smoking  Liver disease  Personal and family history of blood clot or stroke under the age of 50.  History of heart disease  History of breast cancer  History of lupus  History of hypertension       ROS: 10 point ROS neg other than the symptoms noted above in the HPI.    EXAM:  /74 (BP Location: Left arm, Patient Position: Chair, Cuff Size: Adult Regular)  Pulse 80  Wt 158 lb 4 oz (71.8 kg)  LMP 2018 (Exact Date)  BMI 28.03 kg/m2          ASSESSMENT/PLAN:  There are no contraindications to the use of Depo Provera    (Z30.013) Encounter for initial prescription of injectable contraceptive  (primary encounter diagnosis)  Comment:   Plan: medroxyPROGESTERone (DEPO-PROVERA) 150 MG/ML         injection            (Z30.09) Contraceptive education  Comment:   Plan: medroxyPROGESTERone (DEPO-PROVERA) 150 MG/ML         injection          We reviewed her contraceptive options to include pills, rings, patches, depo, nexplanon and the IUDs.   Risks, benefits side effects and effectiveness were discussed.   She is a bit concerned about wt gain with the depo but will observe.   Reviewed bleeding profile with depo    Visit  length 30 min with >50% spent in counseling regarding contraceptive options, benefits and risks.   .  Time noted does not include any time required to complete procedures.

## 2018-04-12 NOTE — NURSING NOTE
"Chief Complaint   Patient presents with     Contraception     wants to restart Depo injection       Initial /74 (BP Location: Left arm, Patient Position: Chair, Cuff Size: Adult Regular)  Pulse 80  Wt 158 lb 4 oz (71.8 kg)  LMP 03/26/2018 (Exact Date)  BMI 28.03 kg/m2 Estimated body mass index is 28.03 kg/(m^2) as calculated from the following:    Height as of 6/15/17: 5' 3\" (1.6 m).    Weight as of this encounter: 158 lb 4 oz (71.8 kg).  Medication Reconciliation: complete   Heather Dee CMA      "

## 2018-04-12 NOTE — MR AVS SNAPSHOT
After Visit Summary   4/12/2018    Cornelia Mauricio    MRN: [id number]           Patient Information     Date Of Birth          1980        Visit Information        Provider Department      4/12/2018 2:45 PM Gladis Ferris APRN [facility]        Today's Diagnoses     Encounter for initial prescription of injectable contraceptive    -  1    Contraceptive education           Follow-ups after your visit        Your next 10 appointments already scheduled     Apr 12, 2018  3:15 PM CDT   (Arrive by 3:00 PM)   XR PELVIS AND HIP BILATERAL 2 VIEWS with ERXR[facility]   [facility]    [address]  [place] [address] [phone]           Please bring a list of your current medicines to your exam. (Include vitamins, minerals and over-thecounter medicines.) Leave your valuables at home.  Tell your doctor if there is a chance you may be pregnant.  You do not need to do anything special for this exam.            Apr 12, 2018  3:20 PM CDT   Return Visit with Leo Hoffman,    [facility]    [address]  [address]  [place] [address] [phone]              Future tests that were ordered for you today     Open Future Orders        Priority Expected Expires Ordered    XR Pelvis and Hip Bilateral 2 Views Routine 4/12/2018 4/7/2019 4/6/2018            Who to contact     If you have questions or need follow up information about today's clinic visit or your schedule please contact [facility] directly at [phone].  Normal or non-critical lab and imaging results will be communicated to you by MyChart, letter or phone within 4 business days after the clinic has received the results. If you do not hear from us within 7 days, please contact the clinic through MyChart or phone. If you have a critical or abnormal lab result, we will notify you by phone as soon as  possible.  Submit refill requests through [employer] or call your pharmacy and they will forward the refill request to us. Please allow 3 business days for your refill to be completed.          Additional Information About Your Visit        [email or web address] Information     [employer] gives you secure access to your electronic health record. If you see a primary care provider, you can also send messages to your care team and make appointments. If you have questions, please call your primary care clinic.  If you do not have a primary care provider, please call [phone] and they will assist you.        Care EveryWhere ID     This is your Care EveryWhere ID. This could be used by other organizations to access your [place] medical records  [id number]        Your Vitals Were     Pulse Last Period BMI (Body Mass Index)             80 03/26/2018 (Exact Date) 28.03 kg/m2          Blood Pressure from Last 3 Encounters:   04/12/18 114/74   05/17/17 98/66   10/14/16 120/60    Weight from Last 3 Encounters:   04/12/18 158 lb 4 oz (71.8 kg)   06/15/17 149 lb (67.6 kg)   05/17/17 148 lb (67.1 kg)              Today, you had the following     No orders found for display         Today's Medication Changes          These changes are accurate as of 4/12/18  3:13 PM.  If you have any questions, ask your nurse or doctor.               Start taking these medicines.        Dose/Directions    medroxyPROGESTERone 150 MG/ML injection   Commonly known as:  DEPO-PROVERA   Used for:  Encounter for initial prescription of injectable contraceptive, Contraceptive education   Started by:  Gladis Ferris APRN CNM        Dose:  150 mg   Inject 1 mL (150 mg) into the muscle every 3 months   Quantity:  3 mL   Refills:  3            Where to get your medicines      Some of these will need a paper prescription and others can be bought over the counter.  Ask your nurse if you have questions.     You don't need a prescription for these medications      medroxyPROGESTERone 150 MG/ML injection                Primary Care Provider Office Phone # Fax #    Lyly Zayas PA-C [phone] [address] [place] [address]        Equal Access to Services     [facility] : Hadii clint liang michaelo Sojeali, waaxda luqadaha, qaybta kaalmada adeamrit, eugene kelsey keeedilia boyd laMarycarmenbrenden frank. So [facility] [phone].    ATENCIÓN: Si habla español, tiene a valladares disposición servicios gratuitos de asistencia lingüística. Llame al [phone].    We comply with applicable federal civil rights laws and [place] laws. We do not discriminate on the basis of race, color, national origin, age, disability, sex, sexual orientation, or gender identity.            Thank you!     Thank you for choosing [facility]  for your care. Our goal is always to provide you with excellent care. Hearing back from our patients is one way we can continue to improve our services. Please take a few minutes to complete the written survey that you may receive in the mail after your visit with us. Thank you!             Your Updated Medication List - Protect others around you: Learn how to safely use, store and throw away your medicines at www.disposemymeds.org.          This list is accurate as of 4/12/18  3:13 PM.  Always use your most recent med list.                   Brand Name Dispense Instructions for use Diagnosis    cyclobenzaprine 10 MG tablet    FLEXERIL    30 tablet    Take 1 tablet (10 mg) by mouth 3 times daily as needed for muscle spasms    Midline low back pain without sciatica, unspecified chronicity       glucosamine 500 MG Caps      Take 1 tablet by mouth daily        KRILL OIL PO      Take 1 capsule by mouth daily        medroxyPROGESTERone 150 MG/ML injection    DEPO-PROVERA    3 mL    Inject 1 mL (150 mg) into the muscle every 3 months    Encounter for initial prescription of injectable contraceptive, Contraceptive education       meloxicam 15 MG  tablet    MOBIC    30 tablet    Take 1 tablet (15 mg) by mouth daily    Strain of left hip adductor muscle, initial encounter       MULTIVITAMIN ADULT PO           traMADol 50 MG tablet    ULTRAM    30 tablet    Take 1 tablet (50 mg) by mouth every 6 hours as needed for pain maximum 2 tablet(s) per day    Midline low back pain without sciatica, unspecified chronicity       triamcinolone 0.1 % cream    KENALOG    30 g    aaa bid prn    Eczema

## 2018-04-16 ENCOUNTER — TELEPHONE (OUTPATIENT)
Dept: LAB | Facility: OTHER | Age: 38
End: 2018-04-16

## 2018-04-16 ENCOUNTER — E-VISIT (OUTPATIENT)
Dept: FAMILY MEDICINE | Facility: OTHER | Age: 38
End: 2018-04-16
Payer: COMMERCIAL

## 2018-04-16 DIAGNOSIS — N94.9 VAGINAL SYMPTOM: ICD-10-CM

## 2018-04-16 DIAGNOSIS — N76.0 BV (BACTERIAL VAGINOSIS): Primary | ICD-10-CM

## 2018-04-16 DIAGNOSIS — N94.9 VAGINAL SYMPTOM: Primary | ICD-10-CM

## 2018-04-16 DIAGNOSIS — B96.89 BV (BACTERIAL VAGINOSIS): Primary | ICD-10-CM

## 2018-04-16 LAB
SPECIMEN SOURCE: ABNORMAL
WET PREP SPEC: ABNORMAL

## 2018-04-16 PROCEDURE — 98969 ZZC NONPHYSICIAN ONLINE ASSESSMENT AND MANAGEMENT: CPT | Performed by: PHYSICIAN ASSISTANT

## 2018-04-16 PROCEDURE — 87210 SMEAR WET MOUNT SALINE/INK: CPT | Performed by: PHYSICIAN ASSISTANT

## 2018-04-16 NOTE — TELEPHONE ENCOUNTER
Spoke with pt and informed her that we need to have some kind of office visit in order to prescribe a medication. Pt stated understanding and would like to do mychart visit. Pt will do this as soon as she gets home. Wet prep was ordered as pt did not leave urine sample today. Provider was informed.    Kiara Dorsey CMA ([facility])

## 2018-04-16 NOTE — TELEPHONE ENCOUNTER
Patient was here today to leave a wet prep  For possible yeast. Please place orders as needed. Thank you  Aleksandra BARAHONA) [facility]

## 2018-04-18 RX ORDER — METRONIDAZOLE 500 MG/1
500 TABLET ORAL 2 TIMES DAILY
Qty: 14 TABLET | Refills: 0 | Status: SHIPPED | OUTPATIENT
Start: 2018-04-18 | End: 2018-06-27

## 2018-06-27 ENCOUNTER — OFFICE VISIT (OUTPATIENT)
Dept: FAMILY MEDICINE | Facility: CLINIC | Age: 38
End: 2018-06-27
Payer: COMMERCIAL

## 2018-06-27 VITALS
SYSTOLIC BLOOD PRESSURE: 112 MMHG | TEMPERATURE: 97.4 F | DIASTOLIC BLOOD PRESSURE: 82 MMHG | RESPIRATION RATE: 20 BRPM | BODY MASS INDEX: 28.36 KG/M2 | HEART RATE: 84 BPM | WEIGHT: 160.1 LBS

## 2018-06-27 DIAGNOSIS — F43.23 ADJUSTMENT DISORDER WITH MIXED ANXIETY AND DEPRESSED MOOD: Primary | ICD-10-CM

## 2018-06-27 PROCEDURE — 99214 OFFICE O/P EST MOD 30 MIN: CPT | Performed by: FAMILY MEDICINE

## 2018-06-27 RX ORDER — CITALOPRAM HYDROBROMIDE 20 MG/1
20 TABLET ORAL DAILY
Qty: 30 TABLET | Refills: 1 | Status: SHIPPED | OUTPATIENT
Start: 2018-06-27 | End: 2018-10-08

## 2018-06-27 RX ORDER — ALPRAZOLAM 0.25 MG
0.25 TABLET ORAL 3 TIMES DAILY PRN
Qty: 30 TABLET | Refills: 0 | Status: SHIPPED | OUTPATIENT
Start: 2018-06-27 | End: 2018-10-08

## 2018-06-27 ASSESSMENT — PAIN SCALES - GENERAL: PAINLEVEL: NO PAIN (0)

## 2018-06-27 ASSESSMENT — PATIENT HEALTH QUESTIONNAIRE - PHQ9: 5. POOR APPETITE OR OVEREATING: MORE THAN HALF THE DAYS

## 2018-06-27 ASSESSMENT — ANXIETY QUESTIONNAIRES
6. BECOMING EASILY ANNOYED OR IRRITABLE: NEARLY EVERY DAY
IF YOU CHECKED OFF ANY PROBLEMS ON THIS QUESTIONNAIRE, HOW DIFFICULT HAVE THESE PROBLEMS MADE IT FOR YOU TO DO YOUR WORK, TAKE CARE OF THINGS AT HOME, OR GET ALONG WITH OTHER PEOPLE: EXTREMELY DIFFICULT
5. BEING SO RESTLESS THAT IT IS HARD TO SIT STILL: SEVERAL DAYS
2. NOT BEING ABLE TO STOP OR CONTROL WORRYING: SEVERAL DAYS
GAD7 TOTAL SCORE: 10
1. FEELING NERVOUS, ANXIOUS, OR ON EDGE: MORE THAN HALF THE DAYS
3. WORRYING TOO MUCH ABOUT DIFFERENT THINGS: SEVERAL DAYS
7. FEELING AFRAID AS IF SOMETHING AWFUL MIGHT HAPPEN: NOT AT ALL

## 2018-06-27 NOTE — PROGRESS NOTES
SUBJECTIVE:   Cornelia Mauricio is a 37 year old female who presents to clinic today for the following health issues:  Chief Complaint   Patient presents with     Anxiety       note dictated and pending  electronically signed  Wisam Gregorio MD

## 2018-06-27 NOTE — MR AVS SNAPSHOT
After Visit Summary   6/27/2018    Cornelia Mauricoi    MRN: [id number]           Patient Information     Date Of Birth          1980        Visit Information        Provider Department      6/27/2018 5:00 PM Wisam Gregorio MD [facility]        Today's Diagnoses     Adjustment disorder with mixed anxiety and depressed mood    -  1       Follow-ups after your visit        Your next 10 appointments already scheduled     Jul 11, 2018  3:00 PM CDT   Office Visit with Lyly Zayas PA-C   [facility]    [address] [address] [phone]           Bring a current list of meds and any records pertaining to this visit. For Physicals, please bring immunization records and any forms needing to be filled out. Please arrive 10 minutes early to complete paperwork.              Who to contact     If you have questions or need follow up information about today's clinic visit or your schedule please contact [facility] directly at [phone].  Normal or non-critical lab and imaging results will be communicated to you by [employer], letter or phone within 4 business days after the clinic has received the results. If you do not hear from us within 7 days, please contact the clinic through [employer] or phone. If you have a critical or abnormal lab result, we will notify you by phone as soon as possible.  Submit refill requests through [employer] or call your pharmacy and they will forward the refill request to us. Please allow 3 business days for your refill to be completed.          Additional Information About Your Visit        [employer] Information     [employer] gives you secure access to your electronic health record. If you see a primary care provider, you can also send messages to your care team and make appointments. If you have questions, please call your primary care clinic.  If you do not have a primary care  provider, please call [phone] and they will assist you.        Care EveryWhere ID     This is your Care EveryWhere ID. This could be used by other organizations to access your [place] medical records  [id number]        Your Vitals Were     Pulse Temperature Respirations BMI (Body Mass Index)          84 97.4  F (36.3  C) (Temporal) 20 28.36 kg/m2         Blood Pressure from Last 3 Encounters:   06/27/18 112/82   04/12/18 110/60   04/12/18 114/74    Weight from Last 3 Encounters:   06/27/18 160 lb 1.6 oz (72.6 kg)   04/12/18 154 lb (69.9 kg)   04/12/18 158 lb 4 oz (71.8 kg)              Today, you had the following     No orders found for display         Today's Medication Changes          These changes are accurate as of 6/27/18  6:17 PM.  If you have any questions, ask your nurse or doctor.               Start taking these medicines.        Dose/Directions    ALPRAZolam 0.25 MG tablet   Commonly known as:  XANAX   Used for:  Adjustment disorder with mixed anxiety and depressed mood   Started by:  Wisam Gregorio MD        Dose:  0.25 mg   Take 1 tablet (0.25 mg) by mouth 3 times daily as needed for anxiety   Quantity:  30 tablet   Refills:  0       citalopram 20 MG tablet   Commonly known as:  celeXA   Used for:  Adjustment disorder with mixed anxiety and depressed mood   Started by:  Wisam Gregorio MD        Dose:  20 mg   Take 1 tablet (20 mg) by mouth daily   Quantity:  30 tablet   Refills:  1            Where to get your medicines      Some of these will need a paper prescription and others can be bought over the counter.  Ask your nurse if you have questions.     Bring a paper prescription for each of these medications     ALPRAZolam 0.25 MG tablet    citalopram 20 MG tablet                Primary Care Provider Office Phone # Fax #    Lyly Zayas PA-C [phone] [address] [place] [address]        Equal Access to Services     PJ OG AH: Mukul bianchi  Ministerio, gustavo lucarmelinaadaha, qachayta kaaldair hinds, eugene iftikharin hayaan keeedilia raynaluz marina laMarycarmenbrenden zac. So [facility] [phone].    ATENCIÓN: Si aryala jayant, tiene a valladares disposición servicios gratuitos de asistencia lingüística. Daniele al [phone].    We comply with applicable federal civil rights laws and [place] laws. We do not discriminate on the basis of race, color, national origin, age, disability, sex, sexual orientation, or gender identity.            Thank you!     Thank you for choosing [facility]  for your care. Our goal is always to provide you with excellent care. Hearing back from our patients is one way we can continue to improve our services. Please take a few minutes to complete the written survey that you may receive in the mail after your visit with us. Thank you!             Your Updated Medication List - Protect others around you: Learn how to safely use, store and throw away your medicines at www.disposemymeds.org.          This list is accurate as of 6/27/18  6:17 PM.  Always use your most recent med list.                   Brand Name Dispense Instructions for use Diagnosis    ALPRAZolam 0.25 MG tablet    XANAX    30 tablet    Take 1 tablet (0.25 mg) by mouth 3 times daily as needed for anxiety    Adjustment disorder with mixed anxiety and depressed mood       citalopram 20 MG tablet    celeXA    30 tablet    Take 1 tablet (20 mg) by mouth daily    Adjustment disorder with mixed anxiety and depressed mood

## 2018-06-28 ASSESSMENT — PATIENT HEALTH QUESTIONNAIRE - PHQ9: SUM OF ALL RESPONSES TO PHQ QUESTIONS 1-9: 12

## 2018-06-28 ASSESSMENT — ANXIETY QUESTIONNAIRES: GAD7 TOTAL SCORE: 10

## 2018-06-28 NOTE — PROGRESS NOTES
Visit Date:   06/27/2018      CHIEF COMPLAINT:  Anxiety symptoms.      HISTORY OF PRESENT ILLNESS:  This is a 37-year-old female who has not had significant anxiety in the past but has had some, according to the patient.  She had been on Celexa in the past also, but has not been on this for some time.  However, recently she has had increased anxiety due to family situations, including a daughter who has gotten into some kind of trouble and another close relative that has had a stroke.  This has created much anxiety in the patient, even to the point of her having difficulty doing her work.  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  She had to actually leave work yesterday due to the anxiety and inability to concentrate on her work.  We reviewed her anxiety and depression screen and she does have significant depression along with the anxiety.  Fortunately, she does not have suicidal thoughts and she confirmed this verbally to me also.  There does not appear to be any question regarding that particular issue.      She is otherwise healthy and is not on any other medications at this time.  There is no chance of pregnancy, she states.      REVIEW OF SYSTEMS:  Otherwise negative.      PHYSICAL EXAMINATION:   GENERAL:  Exam shows her to be in no distress.   VITAL SIGNS:  All normal with a blood pressure 112/82, pulse 84, respirations 20 and temperature 97.4.   NECK/HEART/LUNGS:  All clear.   ABDOMEN:  Abdomen exam is normal.     NEUROLOGICAL:  Grossly normal.      IMPRESSION:  Mixed anxiety and depression, precipitated by situational issues.      PLAN:  I had a discussion with patient and  regarding the nature of anxiety and depression and also discussed treatment of those issues, both with medications and counseling.  She did opt to go to counseling and I did also get her an appointment to see her PCP, Lyly Zayas, in followup of these issues, including her medications.  I started  her on low dose Xanax 0.25 mg 3 times a day, but informed her not to take the Xanax while she is driving, and she is warned not to use alcohol while on this medication.  Additionally, I started her on Celexa 20 mg once daily.  Again, she has a followup appointment with Lyly Zayas and we will also schedule her for counseling subsequently.  She is to call back if any problems with the medications or if increased or new symptoms at any time.         LORETO ORELLANA MD             D: 2018   T: 2018   MT: NTS      Name:     ROXANA MARTINEZ   MRN:      [id number]        Account:      [id number]   :      1980           Visit Date:   2018      Document: [id number]

## 2018-07-05 ENCOUNTER — TELEPHONE (OUTPATIENT)
Dept: OBGYN | Facility: OTHER | Age: 38
End: 2018-07-05

## 2018-07-05 DIAGNOSIS — Z30.42 ENCOUNTER FOR MANAGEMENT AND INJECTION OF INJECTABLE PROGESTIN CONTRACEPTIVE: Primary | ICD-10-CM

## 2018-07-05 RX ORDER — MEDROXYPROGESTERONE ACETATE 150 MG/ML
150 INJECTION, SUSPENSION INTRAMUSCULAR
Qty: 3 ML | Refills: 3 | OUTPATIENT
Start: 2018-07-05 | End: 2019-02-20

## 2018-07-05 NOTE — TELEPHONE ENCOUNTER
Patient was seen on 4/12/18 for contraception.  Depo was the planned and looks like MA gave injection that day.  Depo has recently been removed from her medication list, so will need a new order.  Patient coming in on 7/6/18 for next injection.  Can you please place new order.  Leela Guerrero CMA ([facility])

## 2018-07-09 ENCOUNTER — ALLIED HEALTH/NURSE VISIT (OUTPATIENT)
Dept: FAMILY MEDICINE | Facility: OTHER | Age: 38
End: 2018-07-09
Payer: COMMERCIAL

## 2018-07-09 PROCEDURE — 99207 ZZC NO CHARGE LOS: CPT

## 2018-07-09 PROCEDURE — 96372 THER/PROPH/DIAG INJ SC/IM: CPT

## 2018-08-27 ENCOUNTER — OFFICE VISIT (OUTPATIENT)
Dept: URGENT CARE | Facility: RETAIL CLINIC | Age: 38
End: 2018-08-27
Payer: COMMERCIAL

## 2018-08-27 VITALS
TEMPERATURE: 98.6 F | OXYGEN SATURATION: 96 % | SYSTOLIC BLOOD PRESSURE: 128 MMHG | DIASTOLIC BLOOD PRESSURE: 75 MMHG | HEART RATE: 73 BPM

## 2018-08-27 DIAGNOSIS — R30.0 DYSURIA: ICD-10-CM

## 2018-08-27 DIAGNOSIS — R81 GLUCOSURIA: ICD-10-CM

## 2018-08-27 DIAGNOSIS — N30.01 ACUTE CYSTITIS WITH HEMATURIA: Primary | ICD-10-CM

## 2018-08-27 LAB
APPEARANCE UR: ABNORMAL
BILIRUB UR QL: ABNORMAL
COLOR UR: ABNORMAL
GLUCOSE URINE: 100 MG/DL
HGB UR QL: ABNORMAL
KETONES UR QL: 5 MG/DL
LEUKOCYTE ESTERASE URINE: ABNORMAL
NITRITE UR QL STRIP: ABNORMAL
PH UR STRIP: 5 PH (ref 5–7)
PROTEIN ALBUMIN URINE: 30 MG/DL
SOURCE: ABNORMAL
SP GR UR STRIP: 1.03 (ref 1–1.03)
UROBILINOGEN UR QL STRIP: 1 EU/DL (ref 0.2–1)

## 2018-08-27 PROCEDURE — 87088 URINE BACTERIA CULTURE: CPT | Performed by: PHYSICIAN ASSISTANT

## 2018-08-27 PROCEDURE — 99203 OFFICE O/P NEW LOW 30 MIN: CPT | Performed by: PHYSICIAN ASSISTANT

## 2018-08-27 PROCEDURE — 87186 SC STD MICRODIL/AGAR DIL: CPT | Performed by: PHYSICIAN ASSISTANT

## 2018-08-27 PROCEDURE — 81002 URINALYSIS NONAUTO W/O SCOPE: CPT | Mod: QW | Performed by: PHYSICIAN ASSISTANT

## 2018-08-27 PROCEDURE — 87086 URINE CULTURE/COLONY COUNT: CPT | Performed by: PHYSICIAN ASSISTANT

## 2018-08-27 RX ORDER — SULFAMETHOXAZOLE/TRIMETHOPRIM 800-160 MG
1 TABLET ORAL 2 TIMES DAILY
Qty: 6 TABLET | Refills: 0 | Status: SHIPPED | OUTPATIENT
Start: 2018-08-27 | End: 2019-02-20

## 2018-08-27 NOTE — PATIENT INSTRUCTIONS
"Last depo 7/9/2018    .................         * BLADDER INFECTION,Female (Adult)    A bladder infection (\"cystitis\" or \"UTI\") usually causes a constant urge to urinate and a burning when passing urine. Urine may be cloudy, smelly or dark. There may be pain in the lower abdomen. A bladder infection occurs when bacteria from the vaginal area enter the bladder opening (urethra). This can occur from sexual intercourse, wearing tight clothing, dehydration and other factors.  HOME CARE:  1. Drink lots of fluids (at least 6-8 glasses a day, unless you must restrict fluids for other medical reasons). This will force the medicine into your urinary system and flush the bacteria out of your body. Cranberry juice has been shown to help clear out the bacteria.  2. Avoid sexual intercourse until your symptoms are gone.  3. A bladder infection is treated with antibiotics. You may also be given Pyridium (generic = phenazopyridine) to reduce the burning sensation. This medicine will cause your urine to become a bright orange color. The orange urine may stain clothing. You may wear a pad or panty-liner to protect clothing.  PREVENTING FUTURE INFECTIONS:  1. Always wipe from front to back after a bowel movement.  2. Keep the genital area clean and dry.  3. Drink plenty of fluids each day to avoid dehydration.  4. Urinate right after intercourse to flush out the bladder.  5. Wear cotton underwear and cotton-lined panty hose; avoid tight-fitting pants.  6. If you are on birth control pills and are having frequent bladder infections, discuss with your doctor.  FOLLOW UP: Return to this facility or see your doctor if ALL symptoms are not gone after three days of treatment.  GET PROMPT MEDICAL ATTENTION if any of the following occur:    Fever over 101 F (38.3 C)    No improvement by the third day of treatment    Increasing back or abdominal pain    Repeated vomiting; unable to keep medicine down    Weakness, dizziness or " fainting    Vaginal discharge    Pain, redness or swelling in the labia (outer vaginal area)    6113-9625 [employer]. [address] [place] [address], [place] [address]. All rights reserved. This information is not intended as a substitute for professional medical care. Always follow your healthcare professional's instructions.  This information has been modified by your health care provider with permission from the publisher.  .................    We will contact you if the urine culture indicates a change in treatment plan.      Please FOLLOW UP at primary care clinic if not improving, new symptoms, worse or this does not resolve and in a week or so to FOLLOW UP glucosuria.      [facility]  [phone]

## 2018-08-27 NOTE — MR AVS SNAPSHOT
"              After Visit Summary   8/27/2018    Cornelia Mauricio    MRN: [id number]           Patient Information     Date Of Birth          1980        Visit Information        Provider Department      8/27/2018 1:30 PM Linda Paul PA-C [facility]        Today's Diagnoses     Dysuria    -  1    Acute cystitis with hematuria        Glucosuria          Care Instructions    Last depo 7/9/2018    .................         * BLADDER INFECTION,Female (Adult)    A bladder infection (\"cystitis\" or \"UTI\") usually causes a constant urge to urinate and a burning when passing urine. Urine may be cloudy, smelly or dark. There may be pain in the lower abdomen. A bladder infection occurs when bacteria from the vaginal area enter the bladder opening (urethra). This can occur from sexual intercourse, wearing tight clothing, dehydration and other factors.  HOME CARE:  1. Drink lots of fluids (at least 6-8 glasses a day, unless you must restrict fluids for other medical reasons). This will force the medicine into your urinary system and flush the bacteria out of your body. Cranberry juice has been shown to help clear out the bacteria.  2. Avoid sexual intercourse until your symptoms are gone.  3. A bladder infection is treated with antibiotics. You may also be given Pyridium (generic = phenazopyridine) to reduce the burning sensation. This medicine will cause your urine to become a bright orange color. The orange urine may stain clothing. You may wear a pad or panty-liner to protect clothing.  PREVENTING FUTURE INFECTIONS:  1. Always wipe from front to back after a bowel movement.  2. Keep the genital area clean and dry.  3. Drink plenty of fluids each day to avoid dehydration.  4. Urinate right after intercourse to flush out the bladder.  5. Wear cotton underwear and cotton-lined panty hose; avoid tight-fitting pants.  6. If you are on birth control pills and are having frequent bladder infections, " discuss with your doctor.  FOLLOW UP: Return to this facility or see your doctor if ALL symptoms are not gone after three days of treatment.  GET PROMPT MEDICAL ATTENTION if any of the following occur:    Fever over 101 F (38.3 C)    No improvement by the third day of treatment    Increasing back or abdominal pain    Repeated vomiting; unable to keep medicine down    Weakness, dizziness or fainting    Vaginal discharge    Pain, redness or swelling in the labia (outer vaginal area)    7826-6233 [employer]. [address] [place] [address]. All rights reserved. This information is not intended as a substitute for professional medical care. Always follow your healthcare professional's instructions.  This information has been modified by your health care provider with permission from the publisher.  .................    We will contact you if the urine culture indicates a change in treatment plan.      Please FOLLOW UP at primary care clinic if not improving, new symptoms, worse or this does not resolve and in a week or so to FOLLOW UP glucosuria.      [facility]  [phone]            Follow-ups after your visit        Who to contact     You can reach your care team any time of the day by calling [phone].  Notification of test results:  If you have an abnormal lab result, we will notify you by phone as soon as possible.         Additional Information About Your Visit        MyChart Information     [employer] gives you secure access to your electronic health record. If you see a primary care provider, you can also send messages to your care team and make appointments. If you have questions, please call your primary care clinic.  If you do not have a primary care provider, please call [phone] and they will assist you.        Care EveryWhere ID     This is your Care EveryWhere ID. This could be used by other organizations to access your [facility]  records  [id number]        Your Vitals Were     Pulse Temperature Pulse Oximetry             73 98.6  F (37  C) (Oral) 96%          Blood Pressure from Last 3 Encounters:   08/27/18 128/75   06/27/18 112/82   04/12/18 110/60    Weight from Last 3 Encounters:   06/27/18 160 lb 1.6 oz (72.6 kg)   04/12/18 154 lb (69.9 kg)   04/12/18 158 lb 4 oz (71.8 kg)              We Performed the Following     HCL U/A, W/O MICRO, NON AUTO     Urine Culture Aerobic Bacterial          Today's Medication Changes          These changes are accurate as of 8/27/18  1:55 PM.  If you have any questions, ask your nurse or doctor.               Start taking these medicines.        Dose/Directions    sulfamethoxazole-trimethoprim 800-160 MG per tablet   Commonly known as:  BACTRIM DS/SEPTRA DS   Used for:  Acute cystitis with hematuria   Started by:  Linda Paul PA-C        Dose:  1 tablet   Take 1 tablet by mouth 2 times daily for 3 days   Quantity:  6 tablet   Refills:  0            Where to get your medicines      These medications were [address]  [address] [facility] [address]     Phone:  [phone]     sulfamethoxazole-trimethoprim 800-160 MG per tablet                Primary Care Provider Office Phone # Fax #    Lyly Zayas PA-C [phone] [address] [place] [address]        Equal Access to Services     JOSIE OG AH: Hadmariela rodriguezo Sojeali, waaxda luqadaha, qaybta kaalmada adeegyada, waxjoselin mao kelsey adeedilia frank. So [facility] [phone].    ATENCIÓN: Si habla español, tiene a valladares disposición servicios gratuitos de asistencia lingüística. Llame al [phone].    We comply with applicable federal civil rights laws and [place] laws. We do not discriminate on the basis of race, color, national origin, age, disability, sex, sexual orientation, or gender identity.            Thank you!     Thank you for choosing [facility]  for your care. Our goal is always to provide you with excellent care. Hearing back from our patients is one way we can continue to improve our services. Please take a few minutes to complete the written survey that you may receive in the mail after your visit with us. Thank you!             Your Updated Medication List - Protect others around you: Learn how to safely use, store and throw away your medicines at www.disposemymeds.org.          This list is accurate as of 8/27/18  1:55 PM.  Always use your most recent med list.                   Brand Name Dispense Instructions for use Diagnosis    ALPRAZolam 0.25 MG tablet    XANAX    30 tablet    Take 1 tablet (0.25 mg) by mouth 3 times daily as needed for anxiety    Adjustment disorder with mixed anxiety and depressed mood       AZO CONFIDENCE OR           citalopram 20 MG tablet    celeXA    30 tablet    Take 1 tablet (20 mg) by mouth daily    Adjustment disorder with mixed anxiety and depressed mood       medroxyPROGESTERone 150 MG/ML injection    DEPO-PROVERA    3 mL    Inject 1 mL (150 mg) into the muscle every 3 months    Encounter for management and injection of injectable progestin contraceptive       sulfamethoxazole-trimethoprim 800-160 MG per tablet    BACTRIM DS/SEPTRA DS    6 tablet    Take 1 tablet by mouth 2 times daily for 3 days    Acute cystitis with hematuria

## 2018-08-27 NOTE — PROGRESS NOTES
Cornelia Mauricio is a 37 year old female who  Presents to the [facility] today for a possible UTI. Symptoms of dysuria, frequency, and polyuria have been going on for 1 days.  There is no history of backache, fever, chills, nausea or vomiting.  No recent history of vaginal discharge or infection. No history of pyelonephritis. No concerns for STD's. This patient does  have a history of urinary tract infections.   Denies  Pregnancy - on Depo. She does have some spotting    Past Medical History:   Diagnosis Date     Depressive disorder      Esophageal reflux      Suicide and self-inflicted poisoning by drug or medicinal substance (H)     tylenol OD age 17 or 18     Vaginismus      Past Surgical History:   Procedure Laterality Date     ABDOMEN SURGERY   &     . Spinal surgery     BACK SURGERY       C  DELIVERY ONLY      , Low Cervical     ESOPHAGOSCOPY, GASTROSCOPY, DUODENOSCOPY (EGD), COMBINED  2011    Procedure:COMBINED ESOPHAGOSCOPY, GASTROSCOPY, DUODENOSCOPY (EGD), BIOPSY SINGLE OR MULTIPLE; multiple; Surgeon:JEFFRY BE; Location:[facility]     Patient Active Problem List   Diagnosis     Hemorrhage of rectum and anus     Benign neoplasm of skin of trunk, except scrotum     Pelvic pain in female     GERD (gastroesophageal reflux disease)     Insomnia     CARDIOVASCULAR SCREENING; LDL GOAL LESS THAN 160     Hip impingement syndrome, left     Hip impingement syndrome, right     Current Outpatient Prescriptions   Medication     Homeopathic Products (AZO CONFIDENCE OR)     medroxyPROGESTERone (DEPO-PROVERA) 150 MG/ML injection     sulfamethoxazole-trimethoprim (BACTRIM DS/SEPTRA DS) 800-160 MG per tablet     ALPRAZolam (XANAX) 0.25 MG tablet     citalopram (CELEXA) 20 MG tablet     No current facility-administered medications for this visit.            ROS:  ENT - denies ear pain, throat pain. No nasal congestion.  CP - no cough,SOB or chest pain.   GI/ -  Appetite - normal. No nausea, vomiting or diarrhea.   No bowel  changes   MSK - no joint pain or swelling.   Skin-  No rashes. No lesions.    Past Medical History:   Diagnosis Date     Depressive disorder      Esophageal reflux      Suicide and self-inflicted poisoning by drug or medicinal substance (H)     tylenol OD age 17 or 18     Vaginismus      Past Surgical History:   Procedure Laterality Date     ABDOMEN SURGERY   &     . Spinal surgery     BACK SURGERY       C  DELIVERY ONLY      , Low Cervical     ESOPHAGOSCOPY, GASTROSCOPY, DUODENOSCOPY (EGD), COMBINED  2011    Procedure:COMBINED ESOPHAGOSCOPY, GASTROSCOPY, DUODENOSCOPY (EGD), BIOPSY SINGLE OR MULTIPLE; multiple; Surgeon:JEFFRY BE; Location: GI     Patient Active Problem List   Diagnosis     Hemorrhage of rectum and anus     Benign neoplasm of skin of trunk, except scrotum     Pelvic pain in female     GERD (gastroesophageal reflux disease)     Insomnia     CARDIOVASCULAR SCREENING; LDL GOAL LESS THAN 160     Hip impingement syndrome, left     Hip impingement syndrome, right         O: /75  Pulse 73  Temp 98.6  F (37  C) (Oral)  SpO2 96%     The patient appears comfortable and in no apparent distress.  Afebrile.  Back: no CVA or flank tenderness.  Abdomen: soft, positive for bowel sounds, some  supra pubic tenderness.  Neck: supple, no adenopathy, and thyroid normal size, non-tender, without nodularity.  Lungs -clear    See lab results.    A:    Dysuria  Acute cystitis with hematuria  Glucosuria    P: Prescriptions as below. Discussed indications, dosing, side affects and adverse reactions of medications with  Patient - Bactrim.  Take a probiotic or eat yogurt while on antibiotics.  Drink plenty of fluids.    Prevention and treatment of UTI's discussed.  Signs and symptoms of pyelonephritis mentioned.    Urine culture pending. Pt will be contacted if change in treatment plan is  "indicated.    AVS given and discussed:    Patient Instructions   Last depo 7/9/2018    .................         * BLADDER INFECTION,Female (Adult)    A bladder infection (\"cystitis\" or \"UTI\") usually causes a constant urge to urinate and a burning when passing urine. Urine may be cloudy, smelly or dark. There may be pain in the lower abdomen. A bladder infection occurs when bacteria from the vaginal area enter the bladder opening (urethra). This can occur from sexual intercourse, wearing tight clothing, dehydration and other factors.  HOME CARE:  1. Drink lots of fluids (at least 6-8 glasses a day, unless you must restrict fluids for other medical reasons). This will force the medicine into your urinary system and flush the bacteria out of your body. Cranberry juice has been shown to help clear out the bacteria.  2. Avoid sexual intercourse until your symptoms are gone.  3. A bladder infection is treated with antibiotics. You may also be given Pyridium (generic = phenazopyridine) to reduce the burning sensation. This medicine will cause your urine to become a bright orange color. The orange urine may stain clothing. You may wear a pad or panty-liner to protect clothing.  PREVENTING FUTURE INFECTIONS:  1. Always wipe from front to back after a bowel movement.  2. Keep the genital area clean and dry.  3. Drink plenty of fluids each day to avoid dehydration.  4. Urinate right after intercourse to flush out the bladder.  5. Wear cotton underwear and cotton-lined panty hose; avoid tight-fitting pants.  6. If you are on birth control pills and are having frequent bladder infections, discuss with your doctor.  FOLLOW UP: Return to this facility or see your doctor if ALL symptoms are not gone after three days of treatment.  GET PROMPT MEDICAL ATTENTION if any of the following occur:    Fever over 101 F (38.3 C)    No improvement by the third day of treatment    Increasing back or abdominal pain    Repeated vomiting; unable " to keep medicine down    Weakness, dizziness or fainting    Vaginal discharge    Pain, redness or swelling in the labia (outer vaginal area)    3305-9330 [employer]. [address] [place] [address], [place] [address]. All rights reserved. This information is not intended as a substitute for professional medical care. Always follow your healthcare professional's instructions.  This information has been modified by your health care provider with permission from the publisher.  .................    We will contact you if the urine culture indicates a change in treatment plan.      Please FOLLOW UP at primary care clinic if not improving, new symptoms, worse or this does not resolve and in a week or so to FOLLOW UP glucosuria.      [facility]  [phone]    Pt is comfortable with this plan.  Electronically signed,  KYLEE Paul, PAC

## 2018-08-29 LAB
BACTERIA SPEC CULT: ABNORMAL
Lab: ABNORMAL
SPECIMEN SOURCE: ABNORMAL

## 2018-10-01 ENCOUNTER — ALLIED HEALTH/NURSE VISIT (OUTPATIENT)
Dept: FAMILY MEDICINE | Facility: OTHER | Age: 38
End: 2018-10-01
Payer: COMMERCIAL

## 2018-10-01 VITALS — DIASTOLIC BLOOD PRESSURE: 80 MMHG | SYSTOLIC BLOOD PRESSURE: 120 MMHG

## 2018-10-01 DIAGNOSIS — Z30.42 ENCOUNTER FOR SURVEILLANCE OF INJECTABLE CONTRACEPTIVE: Primary | ICD-10-CM

## 2018-10-01 PROCEDURE — 99207 ZZC NO CHARGE NURSE ONLY: CPT

## 2018-10-01 PROCEDURE — 96372 THER/PROPH/DIAG INJ SC/IM: CPT

## 2018-10-01 NOTE — MR AVS SNAPSHOT
After Visit Summary   10/1/2018    Cornelia Mauricio    MRN: [id number]           Patient Information     Date Of Birth          1980        Visit Information        Provider Department      10/1/2018 2:45 PM TEMITOPE TAM TEAM B, [facility]        Today's Diagnoses     Encounter for surveillance of injectable contraceptive    -  1       Follow-ups after your visit        Who to contact     If you have questions or need follow up information about today's clinic visit or your schedule please contact [facility] directly at [phone].  Normal or non-critical lab and imaging results will be communicated to you by [employer], letter or phone within 4 business days after the clinic has received the results. If you do not hear from us within 7 days, please contact the clinic through [employer] or phone. If you have a critical or abnormal lab result, we will notify you by phone as soon as possible.  Submit refill requests through [employer] or call your pharmacy and they will forward the refill request to us. Please allow 3 business days for your refill to be completed.          Additional Information About Your Visit        MyChart Information     [employer] gives you secure access to your electronic health record. If you see a primary care provider, you can also send messages to your care team and make appointments. If you have questions, please call your primary care clinic.  If you do not have a primary care provider, please call [phone] and they will assist you.        Care EveryWhere ID     This is your Care EveryWhere ID. This could be used by other organizations to access your [facility] medical records  [id number]         Blood Pressure from Last 3 Encounters:   10/01/18 120/80   08/27/18 128/75   06/27/18 112/82    Weight from Last 3 Encounters:   06/27/18 160 lb 1.6 oz (72.6 kg)   04/12/18 154 lb (69.9 kg)   04/12/18 158 lb 4 oz (71.8 kg)              We Performed the  Following     INJECTION INTRAMUSCULAR OR SUB-Q     Medroxyprogesterone inj/1mg (Depo Provera J-Code)        Primary Care Provider Office Phone # Fax #    Lyly Zayas PA-C [phone] [address] [place] [address]        Equal Access to Services     [facility] : Hadii aad ku hadasho Soomaali, waaxda luqadaha, qaybta kaalmada adeegyada, waxay iftikharin trishn adeedilia deb flynn . So [facility] [phone].    ATENCIÓN: Si habla español, tiene a valladares disposición servicios gratuitos de asistencia lingüística. [facility] [phone].    We comply with applicable federal civil rights laws and [place] laws. We do not discriminate on the basis of race, color, national origin, age, disability, sex, sexual orientation, or gender identity.            Thank you!     Thank you for choosing [facility]  for your care. Our goal is always to provide you with excellent care. Hearing back from our patients is one way we can continue to improve our services. Please take a few minutes to complete the written survey that you may receive in the mail after your visit with us. Thank you!             Your Updated Medication List - Protect others around you: Learn how to safely use, store and throw away your medicines at www.disposemymeds.org.          This list is accurate as of 10/1/18  3:09 PM.  Always use your most recent med list.                   Brand Name Dispense Instructions for use Diagnosis    ALPRAZolam 0.25 MG tablet    XANAX    30 tablet    Take 1 tablet (0.25 mg) by mouth 3 times daily as needed for anxiety    Adjustment disorder with mixed anxiety and depressed mood       AZO CONFIDENCE OR           citalopram 20 MG tablet    celeXA    30 tablet    Take 1 tablet (20 mg) by mouth daily    Adjustment disorder with mixed anxiety and depressed mood       medroxyPROGESTERone 150 MG/ML injection    DEPO-PROVERA    3 mL    Inject 1 mL (150 mg) into the muscle every 3 months    Encounter for  management and injection of injectable progestin contraceptive

## 2018-10-01 NOTE — NURSING NOTE
BP: 120/80    LAST PAP/EXAM:   Lab Results   Component Value Date    PAP NIL 02/17/2016     URINE HCG:not indicated    The following medication was given:     MEDICATION: Depo Provera 150mg  ROUTE: IM  SITE: Ventrogluteal - Left  : [employer]  LOT #: [id number]  EXP:5/2020  NEXT INJECTION DUE: 12/17/18 - 12/31/18   Provider: Gladis Ferris    Prior to injection verified patient identity using patient's name and date of birth.  Due to injection administration, patient instructed to remain in clinic for 15 minutes  afterwards, and to report any adverse reaction to me immediately.    Izabela Scanlon, TIKI  October 1, 2018

## 2018-10-08 ENCOUNTER — TELEPHONE (OUTPATIENT)
Dept: FAMILY MEDICINE | Facility: OTHER | Age: 38
End: 2018-10-08

## 2018-10-08 ENCOUNTER — RADIANT APPOINTMENT (OUTPATIENT)
Dept: GENERAL RADIOLOGY | Facility: OTHER | Age: 38
End: 2018-10-08
Attending: FAMILY MEDICINE
Payer: OTHER MISCELLANEOUS

## 2018-10-08 ENCOUNTER — OFFICE VISIT (OUTPATIENT)
Dept: FAMILY MEDICINE | Facility: OTHER | Age: 38
End: 2018-10-08
Payer: COMMERCIAL

## 2018-10-08 VITALS
SYSTOLIC BLOOD PRESSURE: 114 MMHG | TEMPERATURE: 98.5 F | RESPIRATION RATE: 16 BRPM | HEART RATE: 84 BPM | OXYGEN SATURATION: 100 % | BODY MASS INDEX: 27.81 KG/M2 | WEIGHT: 157 LBS | DIASTOLIC BLOOD PRESSURE: 70 MMHG

## 2018-10-08 DIAGNOSIS — G89.29 CHRONIC NECK PAIN: Primary | ICD-10-CM

## 2018-10-08 DIAGNOSIS — M54.2 CHRONIC NECK PAIN: ICD-10-CM

## 2018-10-08 DIAGNOSIS — M54.2 CHRONIC NECK PAIN: Primary | ICD-10-CM

## 2018-10-08 DIAGNOSIS — G89.29 CHRONIC NECK PAIN: ICD-10-CM

## 2018-10-08 PROCEDURE — 72040 X-RAY EXAM NECK SPINE 2-3 VW: CPT

## 2018-10-08 PROCEDURE — 99214 OFFICE O/P EST MOD 30 MIN: CPT | Performed by: FAMILY MEDICINE

## 2018-10-08 ASSESSMENT — PAIN SCALES - GENERAL: PAINLEVEL: MILD PAIN (3)
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
## 2018-10-08 NOTE — TELEPHONE ENCOUNTER
Patient will be calling us with a fax number to fax over the results of her X-ray that was taken today 10/8/18 that Dr. HO ordered.  Signed JESSICA scanned into chart.

## 2018-10-08 NOTE — MR AVS SNAPSHOT
After Visit Summary   10/8/2018    Cornelia Mauricio    MRN: [id number]           Patient Information     Date Of Birth          1980        Visit Information        Provider Department      10/8/2018 3:20 PM Fiorella Case MD [facility]        Today's Diagnoses     Chronic neck pain    -  1       Follow-ups after your visit        Additional Services     PHYSICAL THERAPY REFERRAL       Physical therapy at [facility] in [place] for chronic neck pain.   Range of motion and eccentric neck exercises                  Follow-up notes from your care team     Return in about 2 months (around 12/8/2018).      Future tests that were ordered for you today     Open Future Orders        Priority Expected Expires Ordered    PHYSICAL THERAPY REFERRAL Routine  10/8/2019 10/8/2018    XR Cervical Spine 2/3 Views Routine 10/8/2018 10/8/2019 10/8/2018            Who to contact     If you have questions or need follow up information about today's clinic visit or your schedule please contact [facility] directly at [phone].  Normal or non-critical lab and imaging results will be communicated to you by [employer], letter or phone within 4 business days after the clinic has received the results. If you do not hear from us within 7 days, please contact the clinic through [employer] or phone. If you have a critical or abnormal lab result, we will notify you by phone as soon as possible.  Submit refill requests through [employer] or call your pharmacy and they will forward the refill request to us. Please allow 3 business days for your refill to be completed.          Additional Information About Your Visit        News in [employer] Information     [employer] gives you secure access to your electronic health record. If you see a primary care provider, you can also send messages to your care team and make appointments. If you have questions, please call your primary care clinic.  If you do not have a primary care  provider, please call [phone] and they will assist you.        Care EveryWhere ID     This is your Care EveryWhere ID. This could be used by other organizations to access your [facility] medical records  [id number]        Your Vitals Were     Pulse Temperature Respirations Pulse Oximetry BMI (Body Mass Index)       84 98.5  F (36.9  C) (Temporal) 16 100% 27.81 kg/m2        Blood Pressure from Last 3 Encounters:   10/08/18 114/70   10/01/18 120/80   08/27/18 128/75    Weight from Last 3 Encounters:   10/08/18 157 lb (71.2 kg)   06/27/18 160 lb 1.6 oz (72.6 kg)   04/12/18 154 lb (69.9 kg)                 Today's Medication Changes          These changes are accurate as of 10/8/18  3:54 PM.  If you have any questions, ask your nurse or doctor.               Stop taking these medicines if you haven't already. Please contact your care team if you have questions.     ALPRAZolam 0.25 MG tablet   Commonly known as:  XANAX   Stopped by:  Fiorella Case MD           citalopram 20 MG tablet   Commonly known as:  celeXA   Stopped by:  Fiorella Case MD                    Primary Care Provider Office Phone # Fax #    Lyly Zayas PA-C [phone] [address] [place] [address]        Equal Access to Services     [facility]: Hadii clint liang hadasho Sogary, waaxda luqadaha, qaybta kaalmada guzman, eugene pruett . So [place] -[phone].    ATENCIÓN: Si habla español, tiene a valladares disposición servicios gratuitos de asistencia lingüística. Daniele al [phone].    We comply with applicable federal civil rights laws and [place] laws. We do not discriminate on the basis of race, color, national origin, age, disability, sex, sexual orientation, or gender identity.            Thank you!     Thank you for choosing [facility]  for your care. Our goal is always to provide you with excellent care. Hearing back from our patients is one way we can continue  to improve our services. Please take a few minutes to complete the written survey that you may receive in the mail after your visit with us. Thank you!             Your Updated Medication List - Protect others around you: Learn how to safely use, store and throw away your medicines at www.disposemymeds.org.          This list is accurate as of 10/8/18  3:54 PM.  Always use your most recent med list.                   Brand Name Dispense Instructions for use Diagnosis    AZO CONFIDENCE OR           medroxyPROGESTERone 150 MG/ML injection    DEPO-PROVERA    3 mL    Inject 1 mL (150 mg) into the muscle every 3 months    Encounter for management and injection of injectable progestin contraceptive
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
## 2018-10-09 NOTE — TELEPHONE ENCOUNTER
Pt did not have a fax number.  She would liek this printed and left at the .  She will be here a little before 3.

## 2018-11-07 ENCOUNTER — OFFICE VISIT (OUTPATIENT)
Dept: FAMILY MEDICINE | Facility: OTHER | Age: 38
End: 2018-11-07
Payer: COMMERCIAL

## 2018-11-07 VITALS
TEMPERATURE: 98 F | HEIGHT: 63 IN | SYSTOLIC BLOOD PRESSURE: 116 MMHG | DIASTOLIC BLOOD PRESSURE: 64 MMHG | RESPIRATION RATE: 16 BRPM | BODY MASS INDEX: 29.15 KG/M2 | WEIGHT: 164.5 LBS | HEART RATE: 76 BPM

## 2018-11-07 DIAGNOSIS — Z23 NEED FOR PROPHYLACTIC VACCINATION WITH TETANUS-DIPHTHERIA (TD): ICD-10-CM

## 2018-11-07 DIAGNOSIS — Z23 NEED FOR VACCINATION: ICD-10-CM

## 2018-11-07 DIAGNOSIS — Z13.1 SCREENING FOR DIABETES MELLITUS: ICD-10-CM

## 2018-11-07 DIAGNOSIS — F43.23 ADJUSTMENT DISORDER WITH MIXED ANXIETY AND DEPRESSED MOOD: Primary | ICD-10-CM

## 2018-11-07 DIAGNOSIS — Z23 NEED FOR PROPHYLACTIC VACCINATION AND INOCULATION AGAINST INFLUENZA: ICD-10-CM

## 2018-11-07 LAB — HBA1C MFR BLD: 5.2 % (ref 0–5.6)

## 2018-11-07 PROCEDURE — 83036 HEMOGLOBIN GLYCOSYLATED A1C: CPT | Performed by: PHYSICIAN ASSISTANT

## 2018-11-07 PROCEDURE — 90715 TDAP VACCINE 7 YRS/> IM: CPT | Performed by: PHYSICIAN ASSISTANT

## 2018-11-07 PROCEDURE — 99214 OFFICE O/P EST MOD 30 MIN: CPT | Mod: 25 | Performed by: PHYSICIAN ASSISTANT

## 2018-11-07 PROCEDURE — 36415 COLL VENOUS BLD VENIPUNCTURE: CPT | Performed by: PHYSICIAN ASSISTANT

## 2018-11-07 PROCEDURE — 90471 IMMUNIZATION ADMIN: CPT | Performed by: PHYSICIAN ASSISTANT

## 2018-11-07 ASSESSMENT — ANXIETY QUESTIONNAIRES
GAD7 TOTAL SCORE: 8
1. FEELING NERVOUS, ANXIOUS, OR ON EDGE: MORE THAN HALF THE DAYS
4. TROUBLE RELAXING: SEVERAL DAYS
GAD7 TOTAL SCORE: 8
7. FEELING AFRAID AS IF SOMETHING AWFUL MIGHT HAPPEN: NOT AT ALL
6. BECOMING EASILY ANNOYED OR IRRITABLE: NEARLY EVERY DAY
5. BEING SO RESTLESS THAT IT IS HARD TO SIT STILL: NOT AT ALL
2. NOT BEING ABLE TO STOP OR CONTROL WORRYING: SEVERAL DAYS
7. FEELING AFRAID AS IF SOMETHING AWFUL MIGHT HAPPEN: NOT AT ALL
3. WORRYING TOO MUCH ABOUT DIFFERENT THINGS: SEVERAL DAYS
GAD7 TOTAL SCORE: 8

## 2018-11-07 ASSESSMENT — PATIENT HEALTH QUESTIONNAIRE - PHQ9
10. IF YOU CHECKED OFF ANY PROBLEMS, HOW DIFFICULT HAVE THESE PROBLEMS MADE IT FOR YOU TO DO YOUR WORK, TAKE CARE OF THINGS AT HOME, OR GET ALONG WITH OTHER PEOPLE: VERY DIFFICULT
SUM OF ALL RESPONSES TO PHQ QUESTIONS 1-9: 17
SUM OF ALL RESPONSES TO PHQ QUESTIONS 1-9: 17

## 2018-11-07 NOTE — PROGRESS NOTES
"  SUBJECTIVE:   Cornelia Mauricio is a 37 year old female who presents to clinic today for the following health issues:      History of Present Illness     Depression & Anxiety Follow-up:     Depression/Anxiety:  Depression & Anxiety    Status since last visit::  Worsened    Other associated symptoms of depression and anxiety::  YES    Significant life event::  YES    Current substance use::  Alcohol       Today's PHQ-9         PHQ-9 Total Score:     (P) 17   PHQ-9 Q9 Suicidal ideation:   (P) More than half the days   Thoughts of suicide or self harm:  (P) No   Self-harm Plan:        Self-harm Action:          Safety concerns for self or others: (P) No     TYSAHWN-7 Total Score: (P) 8    Diabetes:     Frequency of checking blood sugars::  Not at all    Diabetic concerns::  Other    Hypoglycemia symptoms::  None    Paraesthesia present::  No    Eye Exam in the last year::  NO    Diabetes Management Resources    Diet:  Regular (no restrictions)  Frequency of exercise:  1 day/week  Duration of exercise:  Less than 15 minutes  Taking medications regularly:  Yes  Medication side effects:  None and Other  Additional concerns today:  No    Medication Followup of Depo    Taking Medication as prescribed: yes    Side Effects:  Bad mood swings, feels like she can cry easily, stress, has felt a few times \"that it would be better not to be on this earth\"    Medication Helping Symptoms:  yes       Problem list and histories reviewed & adjusted, as indicated.  Additional history: She reports that she and her  are .  He  in March, he moved out in June and then the divorce was final at the end of August.  Her teenage daughter is really struggling behaviorally and emotionally with this.  Patient states she is a single mother her  does not have a lot to do with their daughter.  She is not sure if her symptoms are related more to her current situation or perhaps because of the Depo-Provera.  She use it as a " "teenager and does recall increased irritability with its use but not tearfulness and suicidal thoughts.  She has no plans or intention just the thought of no longer being here pops into her head and she does not feel this is normal and it is uncomfortable for her.  She saw Dr. Gregorio in June he gave her Xanax, which she only took a few pills.  He also prescribe citalopram that she never filled it.  In the past she has been on it and she did not like it.  She does need contraception as she is currently with another partner.  She is interested in medications to treat her symptoms of depression.    Also she has been told on a few occasions that she has a sugar in her urine.  She like to be screened for diabetes as both of her parents have diabetes.            BP Readings from Last 3 Encounters:   11/07/18 116/64   10/08/18 114/70   10/01/18 120/80    Wt Readings from Last 3 Encounters:   11/07/18 164 lb 8 oz (74.6 kg)   10/08/18 157 lb (71.2 kg)   06/27/18 160 lb 1.6 oz (72.6 kg)                  Labs reviewed in EPIC    ROS:  CONSTITUTIONAL: NEGATIVE for fever, chills, change in weight  ENT/MOUTH: NEGATIVE for ear, mouth and throat problems  RESP: NEGATIVE for significant cough or SOB  CV: NEGATIVE for chest pain, palpitations or peripheral edema  PSYCHIATRIC: As above    OBJECTIVE:     /64  Pulse 76  Temp 98  F (36.7  C) (Temporal)  Resp 16  Ht 5' 2.99\" (1.6 m)  Wt 164 lb 8 oz (74.6 kg)  BMI 29.15 kg/m2  Body mass index is 29.15 kg/(m^2).  GENERAL: healthy, alert and no distress  PSYCH: mentation appears normal, affect normal/bright    Diagnostic Test Results:  none     ASSESSMENT/PLAN:         1. Adjustment disorder with mixed anxiety and depressed mood  Discussed use of medication, common side effects, how medication works and the fact that improvement in anticipated in 4-6 weeks.   - sertraline (ZOLOFT) 50 MG tablet; Take 1/2 tablet (25 mg) for 1-2 weeks, then increase to 1 tablet orally daily  Dispense: " 30 tablet; Refill: 0  We will do a phone visit in 4 weeks to recheck, her next Depo will be due in December, she is considering using the mini progesterone pill only.  She has a family history of blood clots and is not interested in oral birth control combination pills and does not want to do IUDs that she has this in the past and she had increased vaginal infections and also not interested in Nexplanon    2. Need for prophylactic vaccination and inoculation against influenza  Patient declines    3. Need for prophylactic vaccination with tetanus-diphtheria (Td)  Updated    4. Screening for diabetes mellitus    - Hemoglobin A1c    5. Need for vaccination    - TDAP VACCINE (ADACEL) [51904.002]  - 1st  Administration  [05085]    This chart documentation was completed in part with Dragon voice recognition software.  Documentation is reviewed after completion, however, some words and grammatical errors may remain.  TARYN Agarwal PA-C  [facility]  Electronically signed by Lyly Zayas PA-C    Answers for HPI/ROS submitted by the patient on 11/7/2018   PHQ-2 Score: 6  If you checked off any problems, how difficult have these problems made it for you to do your work, take care of things at home, or get along with other people?: Very difficult  PHQ9 TOTAL SCORE: 17  TYSHAWN 7 TOTAL SCORE: 8

## 2018-11-07 NOTE — PROGRESS NOTES
Screening Questionnaire for Adult Immunization    Are you sick today?   No   Do you have allergies to medications, food, a vaccine component or latex?   No   Have you ever had a serious reaction after receiving a vaccination?   No   Do you have a long-term health problem with heart disease, lung disease, asthma, kidney disease, metabolic disease (e.g. diabetes), anemia, or other blood disorder?   No   Do you have cancer, leukemia, HIV/AIDS, or any other immune system problem?   No   In the past 3 months, have you taken medications that affect  your immune system, such as prednisone, other steroids, or anticancer drugs; drugs for the treatment of rheumatoid arthritis, Crohn s disease, or psoriasis; or have you had radiation treatments?   No   Have you had a seizure, or a brain or other nervous system problem?   No   During the past year, have you received a transfusion of blood or blood     products, or been given immune (gamma) globulin or antiviral drug?   No   For women: Are you pregnant or is there a chance you could become        pregnant during the next month?   No   Have you received any vaccinations in the past 4 weeks?   No     Immunization questionnaire answers were all negative.        Per orders of Lyly Zayas PA-C , injection of tdap given by Micheal Soto. Patient instructed to remain in clinic for 15 minutes afterwards, and to report any adverse reaction to me immediately.    Prior to injection verified patient identity using patient's name and date of birth.  Due to injection administration, patient instructed to remain in clinic for 15 minutes  afterwards, and to report any adverse reaction to me immediately.         Screening performed by Micheal Soto on 11/7/2018 at 4:25 PM.

## 2018-11-07 NOTE — MR AVS SNAPSHOT
After Visit Summary   11/7/2018    Cornelia Mauricio    MRN: [id number]           Patient Information     Date Of Birth          1980        Visit Information        Provider Department      11/7/2018 3:30 PM Lyly Zayas PA-C [facility]        Today's Diagnoses     Adjustment disorder with mixed anxiety and depressed mood    -  1    Need for prophylactic vaccination and inoculation against influenza        Need for prophylactic vaccination with tetanus-diphtheria (Td)        Screening for diabetes mellitus           Follow-ups after your visit        Follow-up notes from your care team     Return in about 4 weeks (around 12/5/2018) for depression/anxiety.      Your next 10 appointments already scheduled     Dec 06, 2018  1:45 PM CST   Telephone Visit with Lyly Zayas PA-C   [facility] ([facility])    [address] [address] [phone]           Note: this is not an onsite visit; there is no need to come to the facility.              Who to contact     If you have questions or need follow up information about today's clinic visit or your schedule please contact [facility] directly at [phone].  Normal or non-critical lab and imaging results will be communicated to you by MyChart, letter or phone within 4 business days after the clinic has received the results. If you do not hear from us within 7 days, please contact the clinic through [facility] On-Linehart or phone. If you have a critical or abnormal lab result, we will notify you by phone as soon as possible.  Submit refill requests through Skulpt or call your pharmacy and they will forward the refill request to us. Please allow 3 business days for your refill to be completed.          Additional Information About Your Visit        MyChart Information     Skulpt gives you secure access to your electronic health record. If you see a primary care provider, you  "can also send messages to your care team and make appointments. If you have questions, please call your primary care clinic.  If you do not have a primary care provider, please call [phone] and they will assist you.        Care EveryWhere ID     This is your Care EveryWhere ID. This could be used by other organizations to access your [place] medical records  [id number]        Your Vitals Were     Pulse Temperature Respirations Height BMI (Body Mass Index)       76 98  F (36.7  C) (Temporal) 16 5' 2.99\" (1.6 m) 29.15 kg/m2        Blood Pressure from Last 3 Encounters:   11/07/18 116/64   10/08/18 114/70   10/01/18 120/80    Weight from Last 3 Encounters:   11/07/18 164 lb 8 oz (74.6 kg)   10/08/18 157 lb (71.2 kg)   06/27/18 160 lb 1.6 oz (72.6 kg)              We Performed the Following     Hemoglobin A1c          Today's Medication Changes          These changes are accurate as of 11/7/18  4:05 PM.  If you have any questions, ask your nurse or doctor.               Start taking these medicines.        Dose/Directions    sertraline 50 MG tablet   Commonly known as:  ZOLOFT   Used for:  Adjustment disorder with mixed anxiety and depressed mood   Started by:  Lyly Zayas PA-C        Take 1/2 tablet (25 mg) for 1-2 weeks, then increase to 1 tablet orally daily   Quantity:  30 tablet   Refills:  0            Where to get your medicines      These medications were sent to [address]  [address], [facility] [address]     Phone:  [phone]     sertraline 50 MG tablet                Primary Care Provider Office Phone # Fax #    Lyly Zayas PA-C [phone] [address] [place] [address]        Equal Access to Services     [facility] : Mukul Mandel, waaxda luqadaha, qaybta kaalmada guzman, eugene frank. So [facility] [phone].    ATENCIÓN: Si habla español, tiene a valladares disposición servicios " miguel angel de asistencia lingüística. Daniele gibson [phone].    We comply with applicable federal civil rights laws and [place] laws. We do not discriminate on the basis of race, color, national origin, age, disability, sex, sexual orientation, or gender identity.            Thank you!     Thank you for choosing [facility]  for your care. Our goal is always to provide you with excellent care. Hearing back from our patients is one way we can continue to improve our services. Please take a few minutes to complete the written survey that you may receive in the mail after your visit with us. Thank you!             Your Updated Medication List - Protect others around you: Learn how to safely use, store and throw away your medicines at www.disposemymeds.org.          This list is accurate as of 11/7/18  4:05 PM.  Always use your most recent med list.                   Brand Name Dispense Instructions for use Diagnosis    AZO CONFIDENCE OR           medroxyPROGESTERone 150 MG/ML injection    DEPO-PROVERA    3 mL    Inject 1 mL (150 mg) into the muscle every 3 months    Encounter for management and injection of injectable progestin contraceptive       sertraline 50 MG tablet    ZOLOFT    30 tablet    Take 1/2 tablet (25 mg) for 1-2 weeks, then increase to 1 tablet orally daily    Adjustment disorder with mixed anxiety and depressed mood

## 2018-11-08 ASSESSMENT — PATIENT HEALTH QUESTIONNAIRE - PHQ9: SUM OF ALL RESPONSES TO PHQ QUESTIONS 1-9: 17

## 2018-11-08 ASSESSMENT — ANXIETY QUESTIONNAIRES: GAD7 TOTAL SCORE: 8

## 2018-12-03 NOTE — PROGRESS NOTES
"Cornelia Mauricio is a 38 year old female who is being evaluated via a telephone visit.      The patient has been notified of following (by M.A) Kiara Dorsey CMA ([facility])       \"We have found that certain health care needs can be provided without the need for a physical exam.  This service lets us provide the care you need with a short phone conversation.  If a prescription is necessary we can send it directly to your pharmacy.  If lab work is needed we can place an order for that and you can then stop by our lab to have the test done at a later time.    This telephone visit will be conducted via 3 way call with the you (the patient) , the physician/provider, and a me all on the line at the same time.  This allows your physician/provider to have the phone conversation with you while I will be taking notes for your medical record.  We will have full access to your [facility] medical record during this entire phone call.    Since this is like an office visit,  will bill your insurance company for this service.  Please check with your medical insurance if this type of telephone/virtual is covered . You may be responsible for the cost of this service if insurance coverage is denied.  The typical cost is $30 (10min), $59(11-20min) and $85 (21-30min)     If during the course of the call the physician/provider feels a telephone visit is not appropriate, you will not be charged for this service\"    Consent has been obtained for this service by care team member: yes.  See the scanned image in the medical record.    S: The history as provided by the patient to the provider during this 3 way call include     Pertinent parts of the the patient's medical history reviewed and confirmed by the provider included :      Total time of call between patient and provider was 8 minutes     Electronically signed by Lyly Zayas PA-C  (MD signature)  ===================================================    I have reviewed the note as documented " above.  This accurately captures the substance of my conversation with the patient,    Additional provider notes: The sertraline has been extremely helpful in improving her mental health.  She no longer feels depressed and her anxiety is well controlled.  She has no suicidal thoughts whatsoever.  She does however have a significant decrease in libido which is disturbing to her.  She is feeling  great overall however wants to taper off of sertraline due to the libido effects.      She does not want to continue Depo-Provera as she is wondering if this does have an effect on her mental health.   We did consider changing her to the progesterone only pill due to her mom's history of factor V Leiden.  Patient is not a smoker never has been.  Patient has never had a blood clot.  Am apprehensive to prescribe birth control pills until we know her factor V status.  Patient is agreeable to doing the blood work           Assessment/Plan:  .  Adjustment disorder with mixed anxiety and depression-she will taper down to one half of a pill or 25 mg of sertraline to see if that improves her libido yet still continues to treat her symptoms well.  If she is finding that her depression is returning she needs to contact me at once and we will try an alternate  treatment plan.  Will await her factor V Leiden test, if this Comes back positive it is unsafe to prescribe estrogen containing pills and would do the  progesterone only minipill      This chart documentation was completed in part with Dragon voice recognition software.  Documentation is reviewed after completion, however, some words and grammatical errors may remain.  Lyly Zayas PA-C

## 2018-12-06 ENCOUNTER — VIRTUAL VISIT (OUTPATIENT)
Dept: FAMILY MEDICINE | Facility: OTHER | Age: 38
End: 2018-12-06
Payer: COMMERCIAL

## 2018-12-06 DIAGNOSIS — Z83.2 FAMILY HISTORY OF CLOTTING DISORDER: Primary | ICD-10-CM

## 2018-12-06 DIAGNOSIS — F43.23 ADJUSTMENT DISORDER WITH MIXED ANXIETY AND DEPRESSED MOOD: ICD-10-CM

## 2018-12-06 PROCEDURE — 99441 C NONPHYSICIAN TELEPHONE ASSESSMENT 5-10 MIN: CPT | Performed by: PHYSICIAN ASSISTANT

## 2018-12-06 ASSESSMENT — ANXIETY QUESTIONNAIRES
3. WORRYING TOO MUCH ABOUT DIFFERENT THINGS: NOT AT ALL
5. BEING SO RESTLESS THAT IT IS HARD TO SIT STILL: NOT AT ALL
IF YOU CHECKED OFF ANY PROBLEMS ON THIS QUESTIONNAIRE, HOW DIFFICULT HAVE THESE PROBLEMS MADE IT FOR YOU TO DO YOUR WORK, TAKE CARE OF THINGS AT HOME, OR GET ALONG WITH OTHER PEOPLE: NOT DIFFICULT AT ALL
7. FEELING AFRAID AS IF SOMETHING AWFUL MIGHT HAPPEN: NOT AT ALL
2. NOT BEING ABLE TO STOP OR CONTROL WORRYING: NOT AT ALL
GAD7 TOTAL SCORE: 2
6. BECOMING EASILY ANNOYED OR IRRITABLE: SEVERAL DAYS
1. FEELING NERVOUS, ANXIOUS, OR ON EDGE: SEVERAL DAYS

## 2018-12-06 ASSESSMENT — PATIENT HEALTH QUESTIONNAIRE - PHQ9
SUM OF ALL RESPONSES TO PHQ QUESTIONS 1-9: 1
5. POOR APPETITE OR OVEREATING: NOT AT ALL

## 2018-12-06 NOTE — MR AVS SNAPSHOT
After Visit Summary   12/6/2018    Cornelia Mauricio    MRN: [id number]           Patient Information     Date Of Birth          1980        Visit Information        Provider Department      12/6/2018 1:45 PM Lyly Zayas PA-C [facility]        Today's Diagnoses     Family history of clotting disorder    -  1    Adjustment disorder with mixed anxiety and depressed mood           Follow-ups after your visit        Your next 10 appointments already scheduled     Dec 06, 2018  1:45 PM CST   Telephone Visit with Lyly Zayas PA-C   [facility] ([facility])    [address] [address] [facility] [address]   [phone]           Note: this is not an onsite visit; there is no need to come to the facility.            Dec 06, 2018  3:00 PM CST   LAB with NL LAB [facility] ([facility])    [address] [address] [address]   [phone]           Please do not eat 10-12 hours before your appointment if you are coming in fasting for labs on lipids, cholesterol, or glucose (sugar). This does not apply to pregnant women. Water, hot tea and black coffee (with nothing added) are okay. Do not drink other fluids, diet soda or chew gum.              Future tests that were ordered for you today     Open Future Orders        Priority Expected Expires Ordered    Factor 5 leiden mutation analysis Routine  12/6/2019 12/6/2018            Who to contact     If you have questions or need follow up information about today's clinic visit or your schedule please contact [facility] directly at [phone].  Normal or non-critical lab and imaging results will be communicated to you by MyChart, letter or phone within 4 business days after the clinic has received the results. If you do not hear from us within 7 days, please contact the clinic through MyChart or phone. If you have a critical or abnormal lab  result, we will notify you by phone as soon as possible.  Submit refill requests through [employer] Yourself or call your pharmacy and they will forward the refill request to us. Please allow 3 business days for your refill to be completed.          Additional Information About Your Visit        Veroldhart Information     [employer] Yourself gives you secure access to your electronic health record. If you see a primary care provider, you can also send messages to your care team and make appointments. If you have questions, please call your primary care clinic.  If you do not have a primary care provider, please call [phone] and they will assist you.        Care EveryWhere ID     This is your Care EveryWhere ID. This could be used by other organizations to access your [place] medical records  [id number]         Blood Pressure from Last 3 Encounters:   11/07/18 116/64   10/08/18 114/70   10/01/18 120/80    Weight from Last 3 Encounters:   11/07/18 164 lb 8 oz (74.6 kg)   10/08/18 157 lb (71.2 kg)   06/27/18 160 lb 1.6 oz (72.6 kg)              We Performed the Following     NONPHYSICIAN TELEPHONE ASSESSMENT 5-10 MIN        Primary Care Provider Office Phone # Fax #    Lyly Zayas PA-C [phone] [address] [place] [address]        Equal Access to Services     [facility]: Hadii aad ku hadasho Soomaali, waaxda luqadaha, qaybta kaalmada adeegyada, eugene buitragoin hayaan cailin pruett . So [facility] [phone].    ATENCIÓN: Si habla español, tiene a valladares disposición servicios gratuitos de asistencia lingüística. Llame al [phone].    We comply with applicable federal civil rights laws and [place] laws. We do not discriminate on the basis of race, color, national origin, age, disability, sex, sexual orientation, or gender identity.            Thank you!     Thank you for choosing [facility]  for your care. Our goal is always to provide you with excellent care. Hearing back from our  patients is one way we can continue to improve our services. Please take a few minutes to complete the written survey that you may receive in the mail after your visit with us. Thank you!             Your Updated Medication List - Protect others around you: Learn how to safely use, store and throw away your medicines at www.disposemymeds.org.          This list is accurate as of 12/6/18 12:31 PM.  Always use your most recent med list.                   Brand Name Dispense Instructions for use Diagnosis    AZO CONFIDENCE OR           medroxyPROGESTERone 150 MG/ML IM injection    DEPO-PROVERA    3 mL    Inject 1 mL (150 mg) into the muscle every 3 months    Encounter for management and injection of injectable progestin contraceptive       sertraline 50 MG tablet    ZOLOFT    30 tablet    Take 1/2 tablet (25 mg) for 1-2 weeks, then increase to 1 tablet orally daily    Adjustment disorder with mixed anxiety and depressed mood

## 2018-12-07 DIAGNOSIS — Z83.2 FAMILY HISTORY OF CLOTTING DISORDER: ICD-10-CM

## 2018-12-07 PROCEDURE — 36415 COLL VENOUS BLD VENIPUNCTURE: CPT | Performed by: PHYSICIAN ASSISTANT

## 2018-12-07 PROCEDURE — 81241 F5 GENE: CPT | Performed by: PHYSICIAN ASSISTANT

## 2018-12-07 ASSESSMENT — ANXIETY QUESTIONNAIRES: GAD7 TOTAL SCORE: 2

## 2018-12-13 LAB — COPATH REPORT: NORMAL

## 2018-12-17 ENCOUNTER — TELEPHONE (OUTPATIENT)
Dept: FAMILY MEDICINE | Facility: OTHER | Age: 38
End: 2018-12-17

## 2018-12-17 DIAGNOSIS — Z30.011 ENCOUNTER FOR INITIAL PRESCRIPTION OF CONTRACEPTIVE PILLS: Primary | ICD-10-CM

## 2018-12-17 RX ORDER — DESOGESTREL AND ETHINYL ESTRADIOL 0.15-0.03
1 KIT ORAL DAILY
Qty: 84 TABLET | Refills: 3 | Status: SHIPPED | OUTPATIENT
Start: 2018-12-17 | End: 2019-11-23

## 2018-12-17 NOTE — TELEPHONE ENCOUNTER
Reason for Call:  Other prescription    Detailed comments: pt called for lab resutls, relayed [facility] result notes. Pt states ok to go ahead and order birth control to [facility] pharmacy and zoloft not needed.       Call pt with questions or once completed      Phone Number Patient can be reached at: Cell number on file:    Telephone Information:   Mobile [phone]       Best Time: ANY    Can we leave a detailed message on this number? YES    Call taken on 12/17/2018 at 1:59 PM by Cookie Clinton

## 2018-12-18 NOTE — TELEPHONE ENCOUNTER
Please call pt- her bcp has been sent , she may start taking this anytime, though I do recommend starting it on Sunday .  She still is at risk for blood clots from this medication but she is not at a higher risk than the general population.  Please make sure that she gets up and moves around frequently when traveling and alerts us at  once if she has unilateral leg  pain   Lyly Zayas PA-C

## 2019-01-21 ENCOUNTER — TELEPHONE (OUTPATIENT)
Dept: FAMILY MEDICINE | Facility: OTHER | Age: 39
End: 2019-01-21

## 2019-01-21 NOTE — TELEPHONE ENCOUNTER
Reason for Call:  Other prescription    Detailed comments: pt is wondering if they are any antidepressants that [facility] can prescribe that don't have the sexual side effects. If so she would like to come in to be put on them. Please call and advise.     Phone Number Patient can be reached at: Home number on file [phone] (home)    Best Time: any    Can we leave a detailed message on this number? YES    Call taken on 1/21/2019 at 2:57 PM by Janie Aguiar

## 2019-01-22 NOTE — TELEPHONE ENCOUNTER
Spoke with pt and gave information below. Pt is scheduled for OV.    Kiara Dorsey CMA ([facility])

## 2019-01-22 NOTE — TELEPHONE ENCOUNTER
Please call pt- there is 1 antidepressent with fewer side effects, wellbutrin.  Please make appt so I can see her  Lyly Zayas PA-C

## 2019-01-23 NOTE — PROGRESS NOTES
SUBJECTIVE:   Cornelia Mauricio is a 38 year old female who presents to clinic today for the following health issues:      History of Present Illness     Depression & Anxiety Follow-up:     Depression/Anxiety:  Depression & Anxiety    Status since last visit::  Stable    Other associated symptoms of depression and anxiety::  None    Significant life event::  No    Current substance use::  Alcohol       Today's PHQ-9         PHQ-9 Total Score:     (P) 6   PHQ-9 Q9 Suicidal ideation:   (P) Not at all   Thoughts of suicide or self harm:      Self-harm Plan:        Self-harm Action:          Safety concerns for self or others:       TYSHAWN-7 Total Score: (P) 6    Diet:  Regular (no restrictions)  Frequency of exercise:  1 day/week  Duration of exercise:  Less than 15 minutes  Taking medications regularly:  Yes  Medication side effects:  None  Additional concerns today:  No      Problem list and histories reviewed & adjusted, as indicated.  Additional history: Found the sertraline to be very helpful however it was causing anorgasmia she was not interested in continuing this medication.  She has tapered off of this.  Since she went from Depo to birth control pills she has noticed that her PMS has worsened considerably.  It used to be just irritability a day or 2 before now it is throughout her entire period including 2 days after.  She is considering going back on double if this continues.  Also she is missed a pill.  If she continues to do this she will go back to depo as well.  She does not have a history of seizures or eating disorder.  She states she does not use large amounts of alcohol just 1 drink on occasion        BP Readings from Last 3 Encounters:   01/28/19 102/54   11/07/18 116/64   10/08/18 114/70    Wt Readings from Last 3 Encounters:   01/28/19 72.5 kg (159 lb 14.4 oz)   11/07/18 74.6 kg (164 lb 8 oz)   10/08/18 71.2 kg (157 lb)                  Labs reviewed in EPIC    ROS:  See PHQ 9 and TYSHAWN 7 questionnaires  "for symptoms.     OBJECTIVE:     /54   Pulse 80   Temp 98.2  F (36.8  C) (Oral)   Resp 15   Ht 1.6 m (5' 3\")   Wt 72.5 kg (159 lb 14.4 oz)   LMP 01/19/2019   BMI 28.33 kg/m    Body mass index is 28.33 kg/m .  GENERAL: healthy, alert and no distress  PSYCH: mentation appears normal and affect normal/bright    Diagnostic Test Results:  none     ASSESSMENT/PLAN:         1. Screening for malignant neoplasm of cervix  Patient will make an appointment in approximately 1 month    2. Screening for HIV (human immunodeficiency virus)  Discussed that her physical    3. Adjustment disorder with mixed anxiety and depressed mood  Discussed use of medication, common side effects, how medication works and the fact that improvement in anticipated in 4-6 weeks. Recheck OV or phone visit in 1 month  - buPROPion (WELLBUTRIN XL) 150 MG 24 hr tablet; Take 1 tablet (150 mg) by mouth every morning  Dispense: 30 tablet; Refill: 1        Lyly Zayas PA-C  [facility]  "

## 2019-01-28 ENCOUNTER — OFFICE VISIT (OUTPATIENT)
Dept: FAMILY MEDICINE | Facility: OTHER | Age: 39
End: 2019-01-28
Payer: COMMERCIAL

## 2019-01-28 VITALS
HEIGHT: 63 IN | BODY MASS INDEX: 28.33 KG/M2 | RESPIRATION RATE: 15 BRPM | TEMPERATURE: 98.2 F | SYSTOLIC BLOOD PRESSURE: 102 MMHG | HEART RATE: 80 BPM | WEIGHT: 159.9 LBS | DIASTOLIC BLOOD PRESSURE: 54 MMHG

## 2019-01-28 DIAGNOSIS — Z12.4 SCREENING FOR MALIGNANT NEOPLASM OF CERVIX: ICD-10-CM

## 2019-01-28 DIAGNOSIS — F43.23 ADJUSTMENT DISORDER WITH MIXED ANXIETY AND DEPRESSED MOOD: Primary | ICD-10-CM

## 2019-01-28 DIAGNOSIS — Z11.4 SCREENING FOR HIV (HUMAN IMMUNODEFICIENCY VIRUS): ICD-10-CM

## 2019-01-28 PROCEDURE — 99213 OFFICE O/P EST LOW 20 MIN: CPT | Performed by: PHYSICIAN ASSISTANT

## 2019-01-28 RX ORDER — BUPROPION HYDROCHLORIDE 150 MG/1
150 TABLET ORAL EVERY MORNING
Qty: 30 TABLET | Refills: 1 | Status: SHIPPED | OUTPATIENT
Start: 2019-01-28 | End: 2019-02-20 | Stop reason: DRUGHIGH

## 2019-01-28 ASSESSMENT — ANXIETY QUESTIONNAIRES
6. BECOMING EASILY ANNOYED OR IRRITABLE: MORE THAN HALF THE DAYS
7. FEELING AFRAID AS IF SOMETHING AWFUL MIGHT HAPPEN: NOT AT ALL
5. BEING SO RESTLESS THAT IT IS HARD TO SIT STILL: NOT AT ALL
1. FEELING NERVOUS, ANXIOUS, OR ON EDGE: SEVERAL DAYS
4. TROUBLE RELAXING: SEVERAL DAYS
GAD7 TOTAL SCORE: 6
GAD7 TOTAL SCORE: 6
2. NOT BEING ABLE TO STOP OR CONTROL WORRYING: SEVERAL DAYS
GAD7 TOTAL SCORE: 6
3. WORRYING TOO MUCH ABOUT DIFFERENT THINGS: SEVERAL DAYS
7. FEELING AFRAID AS IF SOMETHING AWFUL MIGHT HAPPEN: NOT AT ALL

## 2019-01-28 ASSESSMENT — PATIENT HEALTH QUESTIONNAIRE - PHQ9
SUM OF ALL RESPONSES TO PHQ QUESTIONS 1-9: 6
SUM OF ALL RESPONSES TO PHQ QUESTIONS 1-9: 6
10. IF YOU CHECKED OFF ANY PROBLEMS, HOW DIFFICULT HAVE THESE PROBLEMS MADE IT FOR YOU TO DO YOUR WORK, TAKE CARE OF THINGS AT HOME, OR GET ALONG WITH OTHER PEOPLE: SOMEWHAT DIFFICULT

## 2019-01-28 ASSESSMENT — MIFFLIN-ST. JEOR: SCORE: 1379.43

## 2019-01-28 ASSESSMENT — PAIN SCALES - GENERAL: PAINLEVEL: NO PAIN (0)

## 2019-01-29 ASSESSMENT — ANXIETY QUESTIONNAIRES: GAD7 TOTAL SCORE: 6

## 2019-01-29 ASSESSMENT — PATIENT HEALTH QUESTIONNAIRE - PHQ9: SUM OF ALL RESPONSES TO PHQ QUESTIONS 1-9: 6

## 2019-02-12 NOTE — PROGRESS NOTES
SUBJECTIVE:   CC: Cornelia Mauricio is an 38 year old woman who presents for preventive health visit.     Physical   Annual:     Getting at least 3 servings of Calcium per day:  Yes    Bi-annual eye exam:  Yes    Dental care twice a year:  Yes    Sleep apnea or symptoms of sleep apnea:  None    Diet:  Regular (no restrictions)    Frequency of exercise:  1 day/week    Duration of exercise:  Less than 15 minutes    Taking medications regularly:  Yes    Medication side effects:  Other    Additional concerns today:  No    PHQ-2 Total Score: 2              Today's PHQ-2 Score:   PHQ-2 ( 1999 [employer]) 2/20/2019   Q1: Little interest or pleasure in doing things 1   Q2: Feeling down, depressed or hopeless 1   PHQ-2 Score 2   Q1: Little interest or pleasure in doing things Several days   Q2: Feeling down, depressed or hopeless Several days   PHQ-2 Score 2       Abuse: Current or Past(Physical, Sexual or Emotional)- No  Do you feel safe in your environment? Yes    Social History     Tobacco Use     Smoking status: Never Smoker     Smokeless tobacco: Never Used   Substance Use Topics     Alcohol use: Yes     Alcohol Use 2/20/2019   If you drink alcohol do you typically have greater than 3 drinks per day OR greater than 7 drinks per week? No       Reviewed orders with patient.  Reviewed health maintenance and updated orders accordingly - Yes  Labs reviewed in EPIC  BP Readings from Last 3 Encounters:   02/20/19 122/66   01/28/19 102/54   11/07/18 116/64    Wt Readings from Last 3 Encounters:   02/20/19 69.9 kg (154 lb)   01/28/19 72.5 kg (159 lb 14.4 oz)   11/07/18 74.6 kg (164 lb 8 oz)                    Mammogram not appropriate for this patient based on age.    Pertinent mammograms are reviewed under the imaging tab.  History of abnormal Pap smear:   YES - updated in Problem List and Health Maintenance accordingly  Last 3 Pap and HPV Results:   PAP / HPV Latest Ref Rng & Units 2/17/2016 8/8/2012 3/24/2011   PAP - NIL NIL NIL  "  HPV 16 DNA NEG Negative - -   HPV 18 DNA NEG Negative - -   OTHER HR HPV NEG Negative - -     PAP / HPV Latest Ref Rng & Units 2/17/2016 8/8/2012 3/24/2011   PAP - NIL NIL NIL   HPV 16 DNA NEG Negative - -   HPV 18 DNA NEG Negative - -   OTHER HR HPV NEG Negative - -     Reviewed and updated as needed this visit by clinical staff  Tobacco  Allergies  Meds         Reviewed and updated as needed this visit by Provider            Review of Systems   Constitutional: Negative for chills and fever.   HENT: Negative for congestion, hearing loss and sore throat.    Eyes: Negative for pain and visual disturbance.   Respiratory: Negative for cough and shortness of breath.    Cardiovascular: Negative for chest pain, palpitations and peripheral edema.   Gastrointestinal: Negative for abdominal pain, diarrhea, heartburn, hematochezia and nausea.   Breasts:  Negative for breast mass and discharge.   Genitourinary: Negative for dysuria, frequency, genital sores, hematuria, pelvic pain, urgency, vaginal bleeding and vaginal discharge.   Musculoskeletal: Negative for arthralgias, joint swelling and myalgias.   Neurological: Positive for headaches. Negative for dizziness, weakness and paresthesias.   Psychiatric/Behavioral: Positive for mood changes. The patient is nervous/anxious.      She had very rare HEADACHE, currently has had more stress in her life, so has had a few more HEADACHE recently, feels pain on top of head on occasion she will take Excedrin    She is taking 150 mg of Wellbutrin, she states it is not particularly helpful, she still has PMS and irritability.  She was doing very well with sertraline but had side effects of anorgasmia and wanted to discontinue that med     OBJECTIVE:   /66   Pulse 84   Temp 98.7  F (37.1  C) (Temporal)   Resp 16   Ht 1.595 m (5' 2.8\")   Wt 69.9 kg (154 lb)   LMP 02/16/2019 (Exact Date)   BMI 27.46 kg/m    Physical Exam  GENERAL: healthy, alert and no distress  EYES: Eyes " grossly normal to inspection, PERRL and conjunctivae and sclerae normal  HENT: ear canals and TM's normal, nose and mouth without ulcers or lesions  NECK: no adenopathy, no asymmetry, masses, or scars and thyroid normal to palpation  RESP: lungs clear to auscultation - no rales, rhonchi or wheezes  BREAST: normal without masses, tenderness or nipple discharge and no palpable axillary masses or adenopathy  CV: regular rate and rhythm, normal S1 S2, no S3 or S4, no murmur, click or rub, no peripheral edema and peripheral pulses strong  ABDOMEN: soft, nontender, no hepatosplenomegaly, no masses and bowel sounds normal   (female): normal female external genitalia, normal urethral meatus, vaginal mucosa pink, moist, well rugated, and normal cervix/adnexa/uterus without masses or discharge  MS: no gross musculoskeletal defects noted, no edema  SKIN: no suspicious lesions or rashes  NEURO: Normal strength and tone, mentation intact and speech normal  PSYCH: mentation appears normal, affect normal/bright    Diagnostic Test Results:  No results found for this or any previous visit (from the past 24 hour(s)).    ASSESSMENT/PLAN:   1. Encounter for routine adult health examination without abnormal findings  Doing well    2. Adjustment disorder with mixed anxiety and depressed mood  Try increased dosage, if not successful to control symptoms will reduce dosage back to 150 and add back lower dosage of sertraline  - buPROPion (WELLBUTRIN XL) 300 MG 24 hr tablet; Take 1 tablet (300 mg) by mouth every morning  Dispense: 30 tablet; Refill: 1    3. Screening for malignant neoplasm of cervix    - Pap imaged thin layer screen with HPV - recommended age 30 - 65 years (select HPV order below)  - HPV High Risk Types DNA Cervical    COUNSELING:  Reviewed preventive health counseling, as reflected in patient instructions    BP Readings from Last 1 Encounters:   02/20/19 122/66     Estimated body mass index is 27.46 kg/m  as calculated  "from the following:    Height as of this encounter: 1.595 m (5' 2.8\").    Weight as of this encounter: 69.9 kg (154 lb).    BP Screening:   Last 3 BP Readings:    BP Readings from Last 3 Encounters:   02/20/19 122/66   01/28/19 102/54   11/07/18 116/64       The following was recommended to the patient:  Re-screen BP within a year and recommended lifestyle modifications  Weight management plan: Discussed healthy diet and exercise guidelines     reports that  has never smoked. she has never used smokeless tobacco.      Counseling Resources:  ATP IV Guidelines  Pooled Cohorts Equation Calculator  Breast Cancer Risk Calculator  FRAX Risk Assessment  ICSI Preventive Guidelines  Dietary Guidelines for Americans, 2010  USDA's MyPlate  ASA Prophylaxis  Lung CA Screening    Lyly Zayas PA-C  [facility]  Answers for HPI/ROS submitted by the patient on 2/20/2019   Annual Exam:  PHQ9 TOTAL SCORE: 4  TYSHAWN 7 TOTAL SCORE: 4    "

## 2019-02-20 ENCOUNTER — OFFICE VISIT (OUTPATIENT)
Dept: FAMILY MEDICINE | Facility: OTHER | Age: 39
End: 2019-02-20
Payer: COMMERCIAL

## 2019-02-20 VITALS
RESPIRATION RATE: 16 BRPM | DIASTOLIC BLOOD PRESSURE: 66 MMHG | HEIGHT: 63 IN | SYSTOLIC BLOOD PRESSURE: 122 MMHG | HEART RATE: 84 BPM | TEMPERATURE: 98.7 F | WEIGHT: 154 LBS | BODY MASS INDEX: 27.29 KG/M2

## 2019-02-20 DIAGNOSIS — Z00.00 ENCOUNTER FOR ROUTINE ADULT HEALTH EXAMINATION WITHOUT ABNORMAL FINDINGS: ICD-10-CM

## 2019-02-20 DIAGNOSIS — F43.23 ADJUSTMENT DISORDER WITH MIXED ANXIETY AND DEPRESSED MOOD: ICD-10-CM

## 2019-02-20 DIAGNOSIS — Z12.4 SCREENING FOR MALIGNANT NEOPLASM OF CERVIX: Primary | ICD-10-CM

## 2019-02-20 PROCEDURE — 87624 HPV HI-RISK TYP POOLED RSLT: CPT | Performed by: PHYSICIAN ASSISTANT

## 2019-02-20 PROCEDURE — 99395 PREV VISIT EST AGE 18-39: CPT | Performed by: PHYSICIAN ASSISTANT

## 2019-02-20 PROCEDURE — G0145 SCR C/V CYTO,THINLAYER,RESCR: HCPCS | Performed by: PHYSICIAN ASSISTANT

## 2019-02-20 RX ORDER — BUPROPION HYDROCHLORIDE 300 MG/1
300 TABLET ORAL EVERY MORNING
Qty: 30 TABLET | Refills: 1 | Status: SHIPPED | OUTPATIENT
Start: 2019-02-20 | End: 2019-03-11 | Stop reason: SINTOL

## 2019-02-20 ASSESSMENT — ANXIETY QUESTIONNAIRES
3. WORRYING TOO MUCH ABOUT DIFFERENT THINGS: NOT AT ALL
2. NOT BEING ABLE TO STOP OR CONTROL WORRYING: NOT AT ALL
GAD7 TOTAL SCORE: 4
7. FEELING AFRAID AS IF SOMETHING AWFUL MIGHT HAPPEN: NOT AT ALL
6. BECOMING EASILY ANNOYED OR IRRITABLE: MORE THAN HALF THE DAYS
5. BEING SO RESTLESS THAT IT IS HARD TO SIT STILL: NOT AT ALL
GAD7 TOTAL SCORE: 4
4. TROUBLE RELAXING: NOT AT ALL
1. FEELING NERVOUS, ANXIOUS, OR ON EDGE: MORE THAN HALF THE DAYS
7. FEELING AFRAID AS IF SOMETHING AWFUL MIGHT HAPPEN: NOT AT ALL
GAD7 TOTAL SCORE: 4

## 2019-02-20 ASSESSMENT — ENCOUNTER SYMPTOMS
DIARRHEA: 0
HEARTBURN: 0
EYE PAIN: 0
JOINT SWELLING: 0
FEVER: 0
HEMATOCHEZIA: 0
SORE THROAT: 0
PALPITATIONS: 0
HEADACHES: 1
NAUSEA: 0
SHORTNESS OF BREATH: 0
WEAKNESS: 0
DIZZINESS: 0
ABDOMINAL PAIN: 0
DYSURIA: 0
NERVOUS/ANXIOUS: 1
COUGH: 0
PARESTHESIAS: 0
CHILLS: 0
MYALGIAS: 0
ARTHRALGIAS: 0
HEMATURIA: 0
BREAST MASS: 0
FREQUENCY: 0

## 2019-02-20 ASSESSMENT — PATIENT HEALTH QUESTIONNAIRE - PHQ9
SUM OF ALL RESPONSES TO PHQ QUESTIONS 1-9: 4
SUM OF ALL RESPONSES TO PHQ QUESTIONS 1-9: 4

## 2019-02-20 ASSESSMENT — PAIN SCALES - GENERAL: PAINLEVEL: NO PAIN (0)

## 2019-02-20 ASSESSMENT — MIFFLIN-ST. JEOR: SCORE: 1349.41

## 2019-02-21 ASSESSMENT — ANXIETY QUESTIONNAIRES: GAD7 TOTAL SCORE: 4

## 2019-02-25 LAB
COPATH REPORT: NORMAL
PAP: NORMAL

## 2019-02-26 ENCOUNTER — TELEPHONE (OUTPATIENT)
Dept: FAMILY MEDICINE | Facility: OTHER | Age: 39
End: 2019-02-26

## 2019-02-26 LAB
FINAL DIAGNOSIS: NORMAL
HPV HR 12 DNA CVX QL NAA+PROBE: NEGATIVE
HPV16 DNA SPEC QL NAA+PROBE: NEGATIVE
HPV18 DNA SPEC QL NAA+PROBE: NEGATIVE
SPECIMEN DESCRIPTION: NORMAL
SPECIMEN SOURCE CVX/VAG CYTO: NORMAL

## 2019-02-26 NOTE — TELEPHONE ENCOUNTER
Reason for call:  Pt is calling because the wellbutrin is making her very banks and crabby. She is wanting to see if it would be okay to switch over to the celexa. Please advise.

## 2019-02-26 NOTE — TELEPHONE ENCOUNTER
Please call- we can make this switch, however she has not been on this medication very long, if she is ok with it, lets have her try to take it 1 more week to see if those effects settle down.  If she is not comfortable with this lets do a phone visit  Lyly Zayas PA-C

## 2019-02-26 NOTE — TELEPHONE ENCOUNTER
Spoke to patient, she is willing to try one more week. Let patient know I will postpone and we can reach out to her in a week and see how she is doing.   Robyn Paris MA

## 2019-03-07 NOTE — TELEPHONE ENCOUNTER
At the dosage she is on, she should taper off, does she have the 150 mg tabs at home still?  If so, she should take 1 150 mg pill daily for 2 weeks  Lyly Zayas PA-C

## 2019-03-07 NOTE — TELEPHONE ENCOUNTER
Spoke with patient informed her of message below, no further questions   Closing encounter  Kaur Singer RT (R)       b

## 2019-03-07 NOTE — PROGRESS NOTES
"Cornelia Mauricio is a 38 year old female who is being evaluated via a billable telephone visit.      The patient has been notified of following:     \"This telephone visit will be conducted via a call between you and your physician/provider. We have found that certain health care needs can be provided without the need for a physical exam.  This service lets us provide the care you need with a short phone conversation.  If a prescription is necessary we can send it directly to your pharmacy.  If lab work is needed we can place an order for that and you can then stop by our lab to have the test done at a later time.    If during the course of the call the physician/provider feels a telephone visit is not appropriate, you will not be charged for this service.\"     Consent has been obtained for this service by 1 care team member: yes. See the scanned image in the medical record.    Cornelia Mauricio complains of    Chief Complaint   Patient presents with     MOOD CHANGES       I have reviewed and updated the patient's Past Medical History, Social History, Family History and Medication List.    ALLERGIES  No known drug allergies    Kiara Dorsey CMA ([facility])   (MA signature)    Additional provider notes:   Was having side effects to Wellbutrin, it increased her anxiety.  Patient taper down and off of Wellbutrin and this has improved.  She would like to go back to citalopram.  We have tried various other things, she seems to do the best on citalopram though this effectiveness warrants dosage increases but at least she does not have side effects.    Assessment/Plan:  (F43.23) Adjustment disorder with mixed anxiety and depressed mood  Comment: Restart Celexa  Plan: citalopram (CELEXA) 20 MG tablet        To ensure she is doing well , we will do another phone visit in 1 month, sooner as needed      I have reviewed the note as documented above.  This accurately captures the substance of my conversation with the patient.  .espn "     Total time of call between patient and provider was 2 minutes     Lyly Zayas PA-C

## 2019-03-07 NOTE — TELEPHONE ENCOUNTER
Called patient and scheduled her for a phone visit on Monday as the medication (Wellbutrin) is still making her very banks and crabby.     She is wondering if she can stop the Wellbutrin or if she should slowly stop taking it.     Provider please advise.     Jenniffer Kim MA

## 2019-03-11 ENCOUNTER — VIRTUAL VISIT (OUTPATIENT)
Dept: FAMILY MEDICINE | Facility: OTHER | Age: 39
End: 2019-03-11
Payer: COMMERCIAL

## 2019-03-11 DIAGNOSIS — F43.23 ADJUSTMENT DISORDER WITH MIXED ANXIETY AND DEPRESSED MOOD: ICD-10-CM

## 2019-03-11 PROCEDURE — 99207 ZZC NO BILLABLE SERVICE THIS VISIT: CPT | Performed by: PHYSICIAN ASSISTANT

## 2019-03-11 RX ORDER — CITALOPRAM HYDROBROMIDE 20 MG/1
20 TABLET ORAL DAILY
Qty: 30 TABLET | Refills: 1 | Status: SHIPPED | OUTPATIENT
Start: 2019-03-11 | End: 2019-04-23 | Stop reason: SINTOL

## 2019-03-11 ASSESSMENT — PATIENT HEALTH QUESTIONNAIRE - PHQ9
5. POOR APPETITE OR OVEREATING: SEVERAL DAYS
SUM OF ALL RESPONSES TO PHQ QUESTIONS 1-9: 7

## 2019-03-11 ASSESSMENT — ANXIETY QUESTIONNAIRES
5. BEING SO RESTLESS THAT IT IS HARD TO SIT STILL: SEVERAL DAYS
2. NOT BEING ABLE TO STOP OR CONTROL WORRYING: SEVERAL DAYS
1. FEELING NERVOUS, ANXIOUS, OR ON EDGE: NEARLY EVERY DAY
6. BECOMING EASILY ANNOYED OR IRRITABLE: NEARLY EVERY DAY
7. FEELING AFRAID AS IF SOMETHING AWFUL MIGHT HAPPEN: NOT AT ALL
GAD7 TOTAL SCORE: 10
IF YOU CHECKED OFF ANY PROBLEMS ON THIS QUESTIONNAIRE, HOW DIFFICULT HAVE THESE PROBLEMS MADE IT FOR YOU TO DO YOUR WORK, TAKE CARE OF THINGS AT HOME, OR GET ALONG WITH OTHER PEOPLE: VERY DIFFICULT
3. WORRYING TOO MUCH ABOUT DIFFERENT THINGS: SEVERAL DAYS

## 2019-03-12 ASSESSMENT — ANXIETY QUESTIONNAIRES: GAD7 TOTAL SCORE: 10

## 2019-04-18 NOTE — TELEPHONE ENCOUNTER
Please call pt- for now, she can take 1/2 pill daily of citalopram.  I am happy to refer her if she would like.  I can do a phone visit on Tues or evisit on Tues when I am back if she wants to try something else.  Lyly Zayas PA-C

## 2019-04-18 NOTE — TELEPHONE ENCOUNTER
Reason for Call:  Other side effects    Detailed comments:  Patient is reporting side effects from citalopram, diarrhea and feeling crabby.  She is wondering if she should try another medication or see a specialist?  Thank you    Phone Number Patient can be reached at: Cell number on file:    Telephone Information:   Mobile [phone]       Best Time: any    Can we leave a detailed message on this number? YES    Call taken on 4/18/2019 at 1:36 PM by Kelly Allison

## 2019-04-18 NOTE — TELEPHONE ENCOUNTER
Patient informed, telephone visit scheduled   Next 5 appointments (look out 90 days)    Apr 23, 2019  9:45 AM CDT  Telephone Visit with Lyly Zayas PA-C  [facility] [place] ([facility]) [id number] [facility] [id number] [phone]        Closing encounter  Kaur Singer RT (R)

## 2019-04-18 NOTE — TELEPHONE ENCOUNTER
Called patient and got a little more information. Patient states diarrhea started about 3 days after starting medication, the irritability started more gradually, about a 1 or so. Patient is aware provider is out of clinic till Tuesday, will await her response.     Jez Bernstein,

## 2019-04-23 ENCOUNTER — VIRTUAL VISIT (OUTPATIENT)
Dept: FAMILY MEDICINE | Facility: OTHER | Age: 39
End: 2019-04-23
Payer: COMMERCIAL

## 2019-04-23 DIAGNOSIS — F43.23 ADJUSTMENT DISORDER WITH MIXED ANXIETY AND DEPRESSED MOOD: ICD-10-CM

## 2019-04-23 PROCEDURE — 99441 C NONPHYSICIAN TELEPHONE ASSESSMENT 5-10 MIN: CPT | Performed by: PHYSICIAN ASSISTANT

## 2019-04-23 RX ORDER — BUPROPION HYDROCHLORIDE 100 MG/1
100 TABLET, EXTENDED RELEASE ORAL DAILY
Qty: 30 TABLET | Refills: 1 | Status: SHIPPED | OUTPATIENT
Start: 2019-04-23 | End: 2019-07-16

## 2019-04-23 ASSESSMENT — ANXIETY QUESTIONNAIRES
3. WORRYING TOO MUCH ABOUT DIFFERENT THINGS: SEVERAL DAYS
7. FEELING AFRAID AS IF SOMETHING AWFUL MIGHT HAPPEN: NOT AT ALL
1. FEELING NERVOUS, ANXIOUS, OR ON EDGE: NEARLY EVERY DAY
5. BEING SO RESTLESS THAT IT IS HARD TO SIT STILL: NOT AT ALL
GAD7 TOTAL SCORE: 9
6. BECOMING EASILY ANNOYED OR IRRITABLE: NEARLY EVERY DAY
IF YOU CHECKED OFF ANY PROBLEMS ON THIS QUESTIONNAIRE, HOW DIFFICULT HAVE THESE PROBLEMS MADE IT FOR YOU TO DO YOUR WORK, TAKE CARE OF THINGS AT HOME, OR GET ALONG WITH OTHER PEOPLE: VERY DIFFICULT
2. NOT BEING ABLE TO STOP OR CONTROL WORRYING: SEVERAL DAYS

## 2019-04-23 ASSESSMENT — PATIENT HEALTH QUESTIONNAIRE - PHQ9
5. POOR APPETITE OR OVEREATING: SEVERAL DAYS
SUM OF ALL RESPONSES TO PHQ QUESTIONS 1-9: 9

## 2019-04-23 NOTE — PROGRESS NOTES
"Cornelia Mauricio is a 38 year old female who is being evaluated via a billable telephone visit.      The patient has been notified of following:     \"This telephone visit will be conducted via a call between you and your physician/provider. We have found that certain health care needs can be provided without the need for a physical exam.  This service lets us provide the care you need with a short phone conversation.  If a prescription is necessary we can send it directly to your pharmacy.  If lab work is needed we can place an order for that and you can then stop by our lab to have the test done at a later time.    If during the course of the call the physician/provider feels a telephone visit is not appropriate, you will not be charged for this service.\"     Consent has been obtained for this service by 1 care team member: yes. See the scanned image in the medical record.    Cornelia Mauricio complains of  No chief complaint on file.      I have reviewed and updated the patient's Past Medical History, Social History, Family History and Medication List.    ALLERGIES  No known drug allergies    Kiara Dorsey CMA ([facility])   (MA signature)    Additional provider notes:   Patient has had side effects from many of the antidepressant medications we have been using.  Sertraline has been most helpful but it gave her sexual side effects.  We then tapered her off of this and put her on Wellbutrin though this made her irritable.  We have not tried the medications in combination.  Most recently she was on citalopram but that made her very irritable and gave her diarrhea.  She has tapered off of this.  See PHQ 9 and TYSHAWN 7 questionnaires for symptoms.     Assessment/Plan:  (F43.23) Adjustment disorder with mixed anxiety and depressed mood  Comment: We will try sertraline with a very low dose of Wellbutrin if this fails consider psychiatric collaborative agreement  Plan: sertraline (ZOLOFT) 50 MG tablet, buPROPion         (WELLBUTRIN " SR) 100 MG 12 hr tablet,         NONPHYSICIAN TELEPHONE ASSESSMENT 5-10 MIN        Recheck phone visit 1 month sooner as needed      I have reviewed the note as documented above.  This accurately captures the substance of my conversation with the patient.  Lyly Zayas PA-C     Total time of call between patient and provider was 6 minutes     Lyly Zayas PA-C

## 2019-04-24 ASSESSMENT — ANXIETY QUESTIONNAIRES: GAD7 TOTAL SCORE: 9

## 2019-06-13 ENCOUNTER — OFFICE VISIT (OUTPATIENT)
Dept: FAMILY MEDICINE | Facility: CLINIC | Age: 39
End: 2019-06-13
Payer: OTHER MISCELLANEOUS

## 2019-06-13 ENCOUNTER — TELEPHONE (OUTPATIENT)
Dept: FAMILY MEDICINE | Facility: OTHER | Age: 39
End: 2019-06-13

## 2019-06-13 VITALS
SYSTOLIC BLOOD PRESSURE: 112 MMHG | RESPIRATION RATE: 16 BRPM | WEIGHT: 156 LBS | HEART RATE: 80 BPM | BODY MASS INDEX: 27.64 KG/M2 | HEIGHT: 63 IN | DIASTOLIC BLOOD PRESSURE: 72 MMHG | TEMPERATURE: 98.9 F

## 2019-06-13 DIAGNOSIS — M54.50 ACUTE LEFT-SIDED LOW BACK PAIN WITHOUT SCIATICA: Primary | ICD-10-CM

## 2019-06-13 PROCEDURE — 99214 OFFICE O/P EST MOD 30 MIN: CPT | Performed by: PHYSICIAN ASSISTANT

## 2019-06-13 RX ORDER — METHOCARBAMOL 750 MG/1
750 TABLET, FILM COATED ORAL 3 TIMES DAILY
Qty: 20 TABLET | Refills: 0 | Status: SHIPPED | OUTPATIENT
Start: 2019-06-13 | End: 2019-07-16 | Stop reason: ALTCHOICE

## 2019-06-13 RX ORDER — NAPROXEN 500 MG/1
500 TABLET ORAL 2 TIMES DAILY WITH MEALS
Qty: 20 TABLET | Refills: 0 | Status: SHIPPED | OUTPATIENT
Start: 2019-06-13 | End: 2019-07-16

## 2019-06-13 ASSESSMENT — ANXIETY QUESTIONNAIRES
GAD7 TOTAL SCORE: 6
GAD7 TOTAL SCORE: 6
7. FEELING AFRAID AS IF SOMETHING AWFUL MIGHT HAPPEN: NOT AT ALL

## 2019-06-13 ASSESSMENT — PAIN SCALES - GENERAL: PAINLEVEL: WORST PAIN (10)

## 2019-06-13 ASSESSMENT — PATIENT HEALTH QUESTIONNAIRE - PHQ9
SUM OF ALL RESPONSES TO PHQ QUESTIONS 1-9: 5
10. IF YOU CHECKED OFF ANY PROBLEMS, HOW DIFFICULT HAVE THESE PROBLEMS MADE IT FOR YOU TO DO YOUR WORK, TAKE CARE OF THINGS AT HOME, OR GET ALONG WITH OTHER PEOPLE: SOMEWHAT DIFFICULT

## 2019-06-13 ASSESSMENT — MIFFLIN-ST. JEOR: SCORE: 1361.74

## 2019-06-13 NOTE — PROGRESS NOTES
"Subjective     Cornelia Mauricio is a 38 year old female who presents to clinic today for the following health issues:    History of Present Illness     Back Pain:  She presents for follow up of back pain. Patient's back pain is a new problem.    Original cause of back pain: turning/bending  First noticed back pain: yesterday  Patient feels back pain: constantlyLocation of back pain:  Left lower back, left buttock, left hip and left side of waist  Description of back pain: cramping, sharp and stabbing  Back pain spreads: nowhere    Since patient first noticed back pain, pain is: rapidly worsening  Does back pain interfere with her job:  Yes  On a scale of 1-10 (10 being the worst), patient describes pain as:  10  What makes back pain worse: sitting, standing and twisting  Acupuncture: not helpful  Acetaminophen: not helpful  Activity or exercise: not helpful  Chiropractor:  Not helpful  Cold: not tried  Heat: not tried  Massage: not helpful  Muscle relaxants: not helpful  NSAIDS: not tried  Opioids: not tried  Physical Therapy: not tried  Rest: not helpful  Steroid Injection: not tried  Stretching: not helpful  Surgery: not tried  TENS unit: not tried  Topical pain relievers: not helpful  Other healthcare providers patient is seeing for back pain: None    She eats 2-3 servings of fruits and vegetables daily.She consumes 1 sweetened beverage(s) daily.  She is taking medications regularly.     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 Did the same activity all day, repetitively. Did same task all week. That is not typical- usually varies tasks more than that.   Never a moment of specific marked pain while working. But last night more sore as night went on- achiness.   Then this morning very sore when woke up and today felt like a muscle spasm, shooting pain. Through back.  Took tramadol of her mothers.   OTC pain meds- \"those never work for me\". " No hx of gi ulcer or kidney problems.  Pain is left lumbar and into upper buttocks. Worse with twisting, getting up and down. Deep breathing makes spasm.   No pain into backs of legs. No radiation to abdomen.   No N/T.   No NV, no bowel or bladder changes  No weakness in the legs.  Spinal surgery - lumbar L5/S1. This feels diff.     Did work today but was difficult.       Patient Active Problem List   Diagnosis     Hemorrhage of rectum and anus     Benign neoplasm of skin of trunk, except scrotum     Pelvic pain in female     GERD (gastroesophageal reflux disease)     Insomnia     CARDIOVASCULAR SCREENING; LDL GOAL LESS THAN 160     Hip impingement syndrome, left     Hip impingement syndrome, right     Chronic neck pain     Adjustment disorder with mixed anxiety and depressed mood     Past Surgical History:   Procedure Laterality Date     ABDOMEN SURGERY   &     . Spinal surgery     BACK SURGERY       C  DELIVERY ONLY      , Low Cervical     ESOPHAGOSCOPY, GASTROSCOPY, DUODENOSCOPY (EGD), COMBINED  2011    Procedure:COMBINED ESOPHAGOSCOPY, GASTROSCOPY, DUODENOSCOPY (EGD), BIOPSY SINGLE OR MULTIPLE; multiple; Surgeon:JEFFRY BE; Location: GI       Social History     Tobacco Use     Smoking status: Never Smoker     Smokeless tobacco: Never Used   Substance Use Topics     Alcohol use: Yes     Family History   Problem Relation Age of Onset     Obesity Mother      Gastrointestinal Disease Mother         Gallbladder     Diabetes Mother      Obesity Sister      Psychotic Disorder Sister         Multi personality, bipolar, suicidal, compulsive, anixety     Depression Sister      Gastrointestinal Disease Sister         gallbladder     Diabetes Father      Alcohol/Drug Father         alcohol     Depression Father      Breast Cancer Paternal Grandmother          age 70, unknown why     Eye Disorder Maternal Grandmother         macular degeneration     Depression  "Maternal Grandmother      Gynecology Maternal Grandmother         Hysterectomy - Tumors, cysts     Gastrointestinal Disease Maternal Grandmother         Gallbladder     C.A.D. Maternal Grandfather      Cardiovascular Maternal Grandfather      Heart Disease Maternal Grandfather         MI     Genitourinary Problems Paternal Grandfather         Kidney Failure     Cancer Paternal Grandfather      Neurologic Disorder Daughter         adhd     Musculoskeletal Disorder Paternal Aunt         fibromyalgias         Current Outpatient Medications   Medication Sig Dispense Refill     desogestrel-ethinyl estradiol (APRI) 0.15-30 MG-MCG tablet Take 1 tablet by mouth daily 84 tablet 3     sertraline (ZOLOFT) 50 MG tablet Take 1/2 tablet (25 mg) for 1-2 weeks, then increase to 1 tablet orally daily 30 tablet 1     buPROPion (WELLBUTRIN SR) 100 MG 12 hr tablet Take 1 tablet (100 mg) by mouth daily (Patient not taking: Reported on 6/13/2019) 30 tablet 1     Allergies   Allergen Reactions     No Known Drug Allergies      BP Readings from Last 3 Encounters:   06/13/19 112/72   02/20/19 122/66   01/28/19 102/54    Wt Readings from Last 3 Encounters:   06/13/19 70.8 kg (156 lb)   02/20/19 69.9 kg (154 lb)   01/28/19 72.5 kg (159 lb 14.4 oz)                    Reviewed and updated as needed this visit by Provider         Review of Systems   ROS COMP: Constitutional, HEENT, cardiovascular, pulmonary, gi and gu systems are negative, except as otherwise noted.      Objective    /72   Pulse 80   Temp 98.9  F (37.2  C) (Temporal)   Resp 16   Ht 1.6 m (5' 3\")   Wt 70.8 kg (156 lb)   LMP 06/08/2019   BMI 27.63 kg/m    Body mass index is 27.63 kg/m .   General: well appearing pleasant 37yo female sitting comfortably. Mildly uncomfortable with position change to stand.   Lumbar Spine: No skin changes noted. Tender with palpation of lumbar paraspinous muscles.  Non-tender into SI joints, gluteal area or with pressure along posterior " "thigh. Not TTP into calf muscle.  Has normal forward bending to 30-45 degrees; normal twisting, lateral bending of back. No pain with back extension.  Is able to walk on heels and toes without difficulty.  Normal gait.   Strength 5/5 with leg extension, ankle ROM, and great toe flex/ext.  Straight leg raise normal. No edema. Pulses normal. Patellar reflexes 2+ and symmetric. Achilles reflexes 1-2+ symmetric. Sensation intact to light touch of LE.       Diagnostic Test Results:  none         Assessment & Plan     1. Acute left-sided low back pain without sciatica  Muscular low back pain, no radicular features.  Pt admitted to taking her mothers tramadol and requested something for daytime pain.   Rx for naproxen. Advised no other OTC nsaids or pain meds  Declined to rx tramadol or other narcotic agents  Muscle relaxer for bedtime. If home resting during the day could try- advised no driving and no concurrent alcohol.  Letter to be off work tomorrow  Advised resting, ice, heat.  - methocarbamol (ROBAXIN) 750 MG tablet; Take 1 tablet (750 mg) by mouth 3 times daily  Dispense: 20 tablet; Refill: 0  - naproxen (NAPROSYN) 500 MG tablet; Take 1 tablet (500 mg) by mouth 2 times daily (with meals)  Dispense: 20 tablet; Refill: 0     BMI:   Estimated body mass index is 27.63 kg/m  as calculated from the following:    Height as of this encounter: 1.6 m (5' 3\").    Weight as of this encounter: 70.8 kg (156 lb).     Follow Up: The patient was instructed to contact clinic for worsening symptoms, non-improvement as expected/discussed, and for questions regarding medications or treatment plan. Discussed parameters for follow up and included in After Visit Summary given to patient.      Return in about 2 weeks (around 6/27/2019) for With PCP- as needed.    Shira Mack PA-C  [facility]    "

## 2019-06-13 NOTE — TELEPHONE ENCOUNTER
Reason for Call:  Same Day Appointment, Requested Provider:  MARTI Agarwal    PCP: Lyly Zayas    Reason for visit: pulled back muscle - needing something to help her sleep    Duration of symptoms: 1-2 days     Have you been treated for this in the past? No    Additional comments: possibly be worked in today     Can we leave a detailed message on this number? YES    Phone number patient can be reached at: Home number on file [phone] (home)    Best Time: any     Call taken on 6/13/2019 at 9:16 AM by Juliana Ibrahim

## 2019-06-13 NOTE — TELEPHONE ENCOUNTER
Lyly are you able to work her in? If so what time?     Kiara Dorsey CMA ([facility])

## 2019-06-13 NOTE — PATIENT INSTRUCTIONS
Naproxen - twice daily with food.  Stop if stomach upset  Take for 5-7 days    Ice or heat    Methocarbamol- for sleep. Can use three times daily- if home resting. But no driving, no alcohol.   You were prescribed a muscle relaxer. This can make you dizzy and/or drowsy.   Do NOT drink alcohol while taking.   Try for the first time at home (ie before bed) so you know how it affects you.   Do not drive while taking if you feel dizzy or drowsy.

## 2019-06-13 NOTE — LETTER
[facility]  [id number] [place] [address], [address]  [place] [address]  Phone: [phone]  Fax: [phone]    June 13, 2019        Cornelia Mauricio  [address] [address]          To whom it may concern:    RE: Cornelia Mauricio    Patient was seen and treated today at our clinic. Please excuse patient from work tomorrow 06/14/2019 due to medical condition.     Please contact me for questions or concerns.      Sincerely,        Shira Mack PA-C

## 2019-07-15 DIAGNOSIS — F43.23 ADJUSTMENT DISORDER WITH MIXED ANXIETY AND DEPRESSED MOOD: ICD-10-CM

## 2019-07-15 NOTE — TELEPHONE ENCOUNTER
Patient wanted the Sertraline refilled and changed to 1 tab. Set her up with a telephone visit, tomorrow at 1pm.  Robyn Paris MA

## 2019-07-15 NOTE — TELEPHONE ENCOUNTER
Reason for Call:  Medication or medication refill:    Do you use a [facility]?  Name of the pharmacy and phone number for the current request:  [facility] - [phone]    Name of the medication requested: Bupropion    Other request: Wants refill and for directions changed to 1 pill per day.     Can we leave a detailed message on this number? YES    Phone number patient can be reached at: Home number on file [phone] (home)    Best Time: any    Call taken on 7/15/2019 at 1:37 PM by Lanny Mosqueda

## 2019-07-15 NOTE — TELEPHONE ENCOUNTER
Please call pt- I have been prescribing it for bupropion  mg  1 pill daily, please ask what dosage she is on.  Lyly Zayas PA-C

## 2019-07-15 NOTE — PROGRESS NOTES
"Cornelia Mauricio is a 38 year old female who is being evaluated via a billable telephone visit.      The patient has been notified of following:     \"This telephone visit will be conducted via a call between you and your physician/provider. We have found that certain health care needs can be provided without the need for a physical exam.  This service lets us provide the care you need with a short phone conversation.  If a prescription is necessary we can send it directly to your pharmacy.  If lab work is needed we can place an order for that and you can then stop by our lab to have the test done at a later time.    If during the course of the call the physician/provider feels a telephone visit is not appropriate, you will not be charged for this service.\"     Consent has been obtained for this service by 1 care team member: yes. See the scanned image in the medical record.    Cornelia Mauricio complains of    Chief Complaint   Patient presents with     Depression     Anxiety       I have reviewed and updated the patient's Past Medical History, Social History, Family History and Medication List.    ALLERGIES  No known drug allergies    Kiara Dorsey CMA ([facility])   (MA signature)    Additional provider notes:   She continues to do well with her sertraline 50 mg.  She likes the way that her \"mindset\" is on this medication.  She still does not care for the sexual side effects.  She is using bupropion 100 mg 1 pill daily, it does not necessarily seem to reverse her symptoms but may be it helps some.  In the past with higher dosages she gets very irritable and she is not sure she wants to try a higher dosage    Assessment/Plan:  (F43.23) Adjustment disorder with mixed anxiety and depressed mood  Comment: To new current dosages we may increase her Wellbutrin to 100 mg twice daily but will hold off on this for now as she is doing well  Plan: sertraline (ZOLOFT) 50 MG tablet, buPROPion         (WELLBUTRIN SR) 100 MG 12 hr tablet   "      Recheck 6 months      I have reviewed the note as documented above.  This accurately captures the substance of my conversation with the patient.  Electronically signed by Lyly Zayas PA-C     Total time of call between patient and provider was 2.5 minutes     Lyly Zayas PA-C

## 2019-07-16 ENCOUNTER — VIRTUAL VISIT (OUTPATIENT)
Dept: FAMILY MEDICINE | Facility: OTHER | Age: 39
End: 2019-07-16
Payer: COMMERCIAL

## 2019-07-16 DIAGNOSIS — F43.23 ADJUSTMENT DISORDER WITH MIXED ANXIETY AND DEPRESSED MOOD: ICD-10-CM

## 2019-07-16 PROCEDURE — 99207 ZZC NO CHARGE LOS: CPT | Performed by: PHYSICIAN ASSISTANT

## 2019-07-16 RX ORDER — BUPROPION HYDROCHLORIDE 100 MG/1
100 TABLET, EXTENDED RELEASE ORAL DAILY
Qty: 90 TABLET | Refills: 1 | Status: SHIPPED | OUTPATIENT
Start: 2019-07-16 | End: 2020-06-18

## 2019-07-16 ASSESSMENT — ANXIETY QUESTIONNAIRES
7. FEELING AFRAID AS IF SOMETHING AWFUL MIGHT HAPPEN: NOT AT ALL
6. BECOMING EASILY ANNOYED OR IRRITABLE: SEVERAL DAYS
1. FEELING NERVOUS, ANXIOUS, OR ON EDGE: SEVERAL DAYS
3. WORRYING TOO MUCH ABOUT DIFFERENT THINGS: SEVERAL DAYS
5. BEING SO RESTLESS THAT IT IS HARD TO SIT STILL: NOT AT ALL
2. NOT BEING ABLE TO STOP OR CONTROL WORRYING: SEVERAL DAYS
GAD7 TOTAL SCORE: 4
IF YOU CHECKED OFF ANY PROBLEMS ON THIS QUESTIONNAIRE, HOW DIFFICULT HAVE THESE PROBLEMS MADE IT FOR YOU TO DO YOUR WORK, TAKE CARE OF THINGS AT HOME, OR GET ALONG WITH OTHER PEOPLE: SOMEWHAT DIFFICULT

## 2019-07-16 ASSESSMENT — PATIENT HEALTH QUESTIONNAIRE - PHQ9
SUM OF ALL RESPONSES TO PHQ QUESTIONS 1-9: 6
5. POOR APPETITE OR OVEREATING: NOT AT ALL

## 2019-07-17 ASSESSMENT — ANXIETY QUESTIONNAIRES: GAD7 TOTAL SCORE: 4

## 2019-09-04 ENCOUNTER — APPOINTMENT (OUTPATIENT)
Dept: FAMILY MEDICINE | Facility: CLINIC | Age: 39
End: 2019-09-04
Payer: COMMERCIAL

## 2019-09-04 DIAGNOSIS — Z30.011 ENCOUNTER FOR INITIAL PRESCRIPTION OF CONTRACEPTIVE PILLS: ICD-10-CM

## 2019-09-04 RX ORDER — DESOGESTREL AND ETHINYL ESTRADIOL 0.15-0.03
KIT ORAL
Qty: 84 TABLET | Refills: 3 | OUTPATIENT
Start: 2019-09-04

## 2019-09-04 NOTE — TELEPHONE ENCOUNTER
Pending Prescriptions:                       Disp   Refills    JULEBER 0.15-30 MG-MCG tablet [Pharmacy M*84 tab*3            Sig: TAKE ONE TABLET BY MOUTH ONCE DAILY    Sent 12/17/2018 with 1 year supply. Refill not appropriate at this time.     Next 5 appointments (look out 90 days)    Sep 09, 2019  3:30 PM CDT  PHYSICAL with Lyly Z[facility]as PA-C  [facility] [address] [facility] [address] [phone]            Nicole Carver, RN, BSN

## 2019-10-07 ENCOUNTER — TELEPHONE (OUTPATIENT)
Dept: FAMILY MEDICINE | Facility: OTHER | Age: 39
End: 2019-10-07

## 2019-10-07 NOTE — TELEPHONE ENCOUNTER
Spoke to patient informed of message below. Patient has no further questions at this time.  Ursula Dai CMA ([facility])

## 2019-10-07 NOTE — TELEPHONE ENCOUNTER
"Reason for call:  Patient reporting a symptom    Symptom or request:  Patient calling.  She states she maybe having side effects from taking the birth control pill.  She states she \"feels angry and crabby.\"  She said she noticed she does not feel that way while taking the sugar pill.  She states she has discontinued taking the birth control last Friday.  She is wondering how long that stays in her system so she can monitor her symptoms to see if it is the pill that's causing it.     Duration (how long have symptoms been present): ongoing    Have you been treated for this before? No    Additional comments: none    Phone Number patient can be reached at:  Cell number on file:    Telephone Information:   Mobile [phone]       Best Time:  any    Can we leave a detailed message on this number:  YES    Call taken on 10/7/2019 at 1:58 PM by Kelly Allison    "

## 2019-10-07 NOTE — TELEPHONE ENCOUNTER
Please call pt- I would say it would be pretty quick that she would notice the irritability lessening, I would say within 1 week, maybe 2 at most  Lyly Zayas PA-C

## 2019-11-20 ENCOUNTER — TELEPHONE (OUTPATIENT)
Dept: FAMILY MEDICINE | Facility: OTHER | Age: 39
End: 2019-11-20

## 2019-11-20 DIAGNOSIS — F43.23 ADJUSTMENT DISORDER WITH MIXED ANXIETY AND DEPRESSED MOOD: Primary | ICD-10-CM

## 2019-11-20 NOTE — TELEPHONE ENCOUNTER
Reason for Call: Request for an order or referral:    Order or referral being requested: counseling    Date needed: within one week    Has the patient been seen by the PCP for this problem? YES    Additional comments: Pt is calling to see who would be a good counselor for her and her anger and depression. She said she was researching on her disorders and out of everything it comes down to her having  intermit explosive disorder. She is wondering if there might be a counselor or any one that can treat this certain thing. She has tried anti depressant on and off and those just don't work out. She states that it doesn't matter if the counselor can or can't prescribe. Please advise.     Phone number Patient can be reached at:  Cell number on file:    Telephone Information:   Mobile [phone]       Best Time:  anytime    Can we leave a detailed message on this number?  YES    Call taken on 11/20/2019 at 9:15 AM by Dali Salazar

## 2019-11-20 NOTE — TELEPHONE ENCOUNTER
Please call patient, I have referred her to counseling.  They will call her to schedule.  Depending on how that goes, if she would like to consider other medications she can see me or we can refer to psychiatry as well.  However since we have not seen her in the recent past, she will need an office visit for this.  Lyly Zayas PA-C

## 2019-11-21 NOTE — TELEPHONE ENCOUNTER
Spoke with pt and gave her information below. No questions at time of call.     Kiara Dorsey CMA ([facility])

## 2019-11-23 DIAGNOSIS — Z30.011 ENCOUNTER FOR INITIAL PRESCRIPTION OF CONTRACEPTIVE PILLS: ICD-10-CM

## 2019-11-25 RX ORDER — DESOGESTREL AND ETHINYL ESTRADIOL 0.15-0.03
KIT ORAL
Qty: 28 TABLET | Refills: 0 | Status: SHIPPED | OUTPATIENT
Start: 2019-11-25 | End: 2019-11-25

## 2019-11-25 NOTE — TELEPHONE ENCOUNTER
"Spoke with pt and she stated she is no longer taking this or any birth control as this was making her \"too hormonal\" and that she had told the pharmacy this too. She will stop by there today and remind them. Birth control removed from med list as well.     Kiara Dorsey CMA ([facility])    "

## 2019-11-25 NOTE — TELEPHONE ENCOUNTER
Pending Prescriptions:                       Disp   Refills    JULEBER 0.15-30 MG-MCG tablet [Pharmacy M*28 tab*0            Sig: TAKE ONE TABLET BY MOUTH ONCE DAILY    Medication is being filled for 1 time refill only due to:  Patient needs to be seen because Physical.     Please help schedule    Nicole Carver RN, BSN

## 2019-12-03 ENCOUNTER — OFFICE VISIT (OUTPATIENT)
Dept: PSYCHOLOGY | Facility: CLINIC | Age: 39
End: 2019-12-03
Attending: PHYSICIAN ASSISTANT
Payer: COMMERCIAL

## 2019-12-03 DIAGNOSIS — F43.23 ADJUSTMENT DISORDER WITH MIXED ANXIETY AND DEPRESSED MOOD: ICD-10-CM

## 2019-12-03 PROCEDURE — 90834 PSYTX W PT 45 MINUTES: CPT | Performed by: MARRIAGE & FAMILY THERAPIST

## 2019-12-03 ASSESSMENT — COLUMBIA-SUICIDE SEVERITY RATING SCALE - C-SSRS
FIRST ATTEMPT DATE: 57313
REASONS FOR IDEATION LIFETIME: EQUALLY TO GET ATTENTION, REVENGE OR A REACTION FROM OTHERS AND TO END/STOP THE PAIN
TOTAL  NUMBER OF ABORTED OR SELF INTERRUPTED ATTEMPTS PAST LIFETIME: YES
TOTAL  NUMBER OF INTERRUPTED ATTEMPTS LIFETIME: YES
MOST LETHAL DATE: 57313
TOTAL  NUMBER OF ABORTED OR SELF INTERRUPTED ATTEMPTS PAST 3 MONTHS: NO
1. IN THE PAST MONTH, HAVE YOU WISHED YOU WERE DEAD OR WISHED YOU COULD GO TO SLEEP AND NOT WAKE UP?: NO
ATTEMPT PAST THREE MONTHS: NO
6. HAVE YOU EVER DONE ANYTHING, STARTED TO DO ANYTHING, OR PREPARED TO DO ANYTHING TO END YOUR LIFE?: YES
TOTAL  NUMBER OF INTERRUPTED ATTEMPTS PAST 3 MONTHS: NO
4. HAVE YOU HAD THESE THOUGHTS AND HAD SOME INTENTION OF ACTING ON THEM?: NO
ATTEMPT LIFETIME: YES
LETHALITY/MEDICAL DAMAGE CODE FIRST ACTUAL ATTEMPT: MODERATE PHYSICAL DAMAGE, MEDICAL ATTENTION NEEDED
TOTAL  NUMBER OF ABORTED OR SELF INTERRUPTED ATTEMPTS LIFETIME: 2
5. HAVE YOU STARTED TO WORK OUT OR WORKED OUT THE DETAILS OF HOW TO KILL YOURSELF? DO YOU INTEND TO CARRY OUT THIS PLAN?: YES
5. HAVE YOU STARTED TO WORK OUT OR WORKED OUT THE DETAILS OF HOW TO KILL YOURSELF? DO YOU INTEND TO CARRY OUT THIS PLAN?: NO
4. HAVE YOU HAD THESE THOUGHTS AND HAD SOME INTENTION OF ACTING ON THEM?: NO
LETHALITY/MEDICAL DAMAGE CODE MOST LETHAL ACTUAL ATTEMPT: MODERATE PHYSICAL DAMAGE, MEDICAL ATTENTION NEEDED
2. HAVE YOU ACTUALLY HAD ANY THOUGHTS OF KILLING YOURSELF?: NO
6. HAVE YOU EVER DONE ANYTHING, STARTED TO DO ANYTHING, OR PREPARED TO DO ANYTHING TO END YOUR LIFE?: NO
LETHALITY/MEDICAL DAMAGE CODE MOST RECENT ACTUAL ATTEMPT: MODERATE PHYSICAL DAMAGE, MEDICAL ATTENTION NEEDED
3. HAVE YOU BEEN THINKING ABOUT HOW YOU MIGHT KILL YOURSELF?: NO
1. IN THE PAST MONTH, HAVE YOU WISHED YOU WERE DEAD OR WISHED YOU COULD GO TO SLEEP AND NOT WAKE UP?: NO
2. HAVE YOU ACTUALLY HAD ANY THOUGHTS OF KILLING YOURSELF LIFETIME?: YES
TOTAL  NUMBER OF ACTUAL ATTEMPTS LIFETIME: 2
MOST RECENT DATE: 58837

## 2019-12-03 ASSESSMENT — ANXIETY QUESTIONNAIRES
7. FEELING AFRAID AS IF SOMETHING AWFUL MIGHT HAPPEN: NOT AT ALL
5. BEING SO RESTLESS THAT IT IS HARD TO SIT STILL: SEVERAL DAYS
3. WORRYING TOO MUCH ABOUT DIFFERENT THINGS: SEVERAL DAYS
4. TROUBLE RELAXING: SEVERAL DAYS
GAD7 TOTAL SCORE: 8
1. FEELING NERVOUS, ANXIOUS, OR ON EDGE: SEVERAL DAYS
2. NOT BEING ABLE TO STOP OR CONTROL WORRYING: MORE THAN HALF THE DAYS
IF YOU CHECKED OFF ANY PROBLEMS ON THIS QUESTIONNAIRE, HOW DIFFICULT HAVE THESE PROBLEMS MADE IT FOR YOU TO DO YOUR WORK, TAKE CARE OF THINGS AT HOME, OR GET ALONG WITH OTHER PEOPLE: SOMEWHAT DIFFICULT
6. BECOMING EASILY ANNOYED OR IRRITABLE: MORE THAN HALF THE DAYS

## 2019-12-03 ASSESSMENT — PATIENT HEALTH QUESTIONNAIRE - PHQ9: SUM OF ALL RESPONSES TO PHQ QUESTIONS 1-9: 9

## 2019-12-03 NOTE — PATIENT INSTRUCTIONS
Patient scheduled a return appointment for Wed., Dec. 11 at 4 p.m.  Patient reports that she would like to primarily work on managing her anger and irritability and may be interested in better managing her alcohol use.  Patient was given a handout on ACE scores and the ACE assessment; patient states that she will complete the assessment and bring it with her to the next appt.
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
## 2019-12-04 ASSESSMENT — ANXIETY QUESTIONNAIRES: GAD7 TOTAL SCORE: 8

## 2019-12-06 ENCOUNTER — FCC EXTENDED DOCUMENTATION (OUTPATIENT)
Dept: PSYCHOLOGY | Facility: CLINIC | Age: 39
End: 2019-12-06
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
## 2019-12-11 ENCOUNTER — OFFICE VISIT (OUTPATIENT)
Dept: PSYCHOLOGY | Facility: CLINIC | Age: 39
End: 2019-12-11
Payer: COMMERCIAL

## 2019-12-11 DIAGNOSIS — F33.1 MODERATE RECURRENT MAJOR DEPRESSION (H): ICD-10-CM

## 2019-12-11 DIAGNOSIS — F41.1 GENERALIZED ANXIETY DISORDER: Primary | ICD-10-CM

## 2019-12-11 PROCEDURE — 90791 PSYCH DIAGNOSTIC EVALUATION: CPT | Performed by: MARRIAGE & FAMILY THERAPIST
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
## 2019-12-12 NOTE — PATIENT INSTRUCTIONS
"Patient scheduled a return appointment for Wed., Dec. 18 at 4 p.m.  Patient completed her homework and reports that she scored a 2 on her Adverse Childhood Experiences assessment.  Patient's homework includes reviewing the \"Rules for Fair Fighting\" worksheet with her family.  "

## 2019-12-18 ENCOUNTER — TELEPHONE (OUTPATIENT)
Dept: PSYCHOLOGY | Facility: CLINIC | Age: 39
End: 2019-12-18

## 2019-12-31 ENCOUNTER — FCC EXTENDED DOCUMENTATION (OUTPATIENT)
Dept: PSYCHOLOGY | Facility: CLINIC | Age: 39
End: 2019-12-31
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
## 2020-01-02 ENCOUNTER — OFFICE VISIT (OUTPATIENT)
Dept: PSYCHOLOGY | Facility: CLINIC | Age: 40
End: 2020-01-02
Payer: COMMERCIAL

## 2020-01-02 DIAGNOSIS — F41.1 GENERALIZED ANXIETY DISORDER: Primary | ICD-10-CM

## 2020-01-02 DIAGNOSIS — F33.1 MODERATE RECURRENT MAJOR DEPRESSION (H): ICD-10-CM

## 2020-01-02 DIAGNOSIS — Z30.011 ENCOUNTER FOR INITIAL PRESCRIPTION OF CONTRACEPTIVE PILLS: ICD-10-CM

## 2020-01-02 PROCEDURE — 90834 PSYTX W PT 45 MINUTES: CPT | Performed by: MARRIAGE & FAMILY THERAPIST

## 2020-01-03 NOTE — TELEPHONE ENCOUNTER
Pending Prescriptions:                       Disp   Refills    JULEBER 0.15-30 MG-MCG tablet [Pharmacy Me*28 tab*0        Sig: TAKE ONE TABLET BY MOUTH ONCE DAILY    Routing refill request to provider for review/approval because:  Drug not active on patient's medication list    Note from discontinue states that she is not taking because it made her too hormonal  Is she taking something else???

## 2020-01-03 NOTE — TELEPHONE ENCOUNTER
Left message for pt to return call, when call is returned give information below.    Kiara Dorsey CMA ([facility])

## 2020-01-03 NOTE — TELEPHONE ENCOUNTER
Please call pt- did she request this refill?  I thought she went off this due to mood swings?  Lyly Zayas PA-C

## 2020-01-03 NOTE — PATIENT INSTRUCTIONS
Patient scheduled a return visit for Wed., Jan. 8th at 3 p.m.  Patient completed her pros and cons list and discussed options in session.  Patient's homework: Prepare/Enrich financial worksheets for couples and Relationship Conflict Resolution strategies.
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
## 2020-01-06 RX ORDER — DESOGESTREL AND ETHINYL ESTRADIOL 0.15-0.03
KIT ORAL
Qty: 28 TABLET | Refills: 0 | OUTPATIENT
Start: 2020-01-06

## 2020-01-08 ENCOUNTER — OFFICE VISIT (OUTPATIENT)
Dept: PSYCHOLOGY | Facility: CLINIC | Age: 40
End: 2020-01-08
Payer: COMMERCIAL

## 2020-01-08 DIAGNOSIS — F33.1 MODERATE RECURRENT MAJOR DEPRESSION (H): ICD-10-CM

## 2020-01-08 DIAGNOSIS — F41.1 GENERALIZED ANXIETY DISORDER: Primary | ICD-10-CM

## 2020-01-08 PROCEDURE — 90834 PSYTX W PT 45 MINUTES: CPT | Performed by: MARRIAGE & FAMILY THERAPIST
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
## 2020-01-08 NOTE — PATIENT INSTRUCTIONS
"Patient scheduled a return visit for next Wed., Jan. 15th at 3 p.m.  Patient's homework: review article on \"Healthy Ways to Get Angry\" and the Anger Iceberg.  "

## 2020-01-15 ENCOUNTER — OFFICE VISIT (OUTPATIENT)
Dept: PSYCHOLOGY | Facility: CLINIC | Age: 40
End: 2020-01-15
Payer: COMMERCIAL

## 2020-01-15 DIAGNOSIS — F41.1 GENERALIZED ANXIETY DISORDER: Primary | ICD-10-CM

## 2020-01-15 DIAGNOSIS — F33.1 MODERATE RECURRENT MAJOR DEPRESSION (H): ICD-10-CM

## 2020-01-15 PROCEDURE — 90834 PSYTX W PT 45 MINUTES: CPT | Performed by: MARRIAGE & FAMILY THERAPIST
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
## 2020-01-17 NOTE — PATIENT INSTRUCTIONS
Patient reports that she is continuing to work on setting and establishing boundaries with her boyfriend and daughter.  Patient states that she will try journaling during the next week to try to better manage her irritability and regulate her emotions at work.

## 2020-01-22 ENCOUNTER — OFFICE VISIT (OUTPATIENT)
Dept: PSYCHOLOGY | Facility: CLINIC | Age: 40
End: 2020-01-22
Payer: COMMERCIAL

## 2020-01-22 DIAGNOSIS — F33.1 MODERATE RECURRENT MAJOR DEPRESSION (H): ICD-10-CM

## 2020-01-22 DIAGNOSIS — F41.1 GENERALIZED ANXIETY DISORDER: Primary | ICD-10-CM

## 2020-01-22 PROCEDURE — 90834 PSYTX W PT 45 MINUTES: CPT | Performed by: MARRIAGE & FAMILY THERAPIST

## 2020-01-23 NOTE — PATIENT INSTRUCTIONS
Patient's homework includes: continuing to journal in her anger notebook; practice assertive communication with a soft/gentle start-up; continue to practice enforcing boundaries with boyfriend.
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
## 2020-01-29 ENCOUNTER — OFFICE VISIT (OUTPATIENT)
Dept: PSYCHOLOGY | Facility: CLINIC | Age: 40
End: 2020-01-29
Payer: COMMERCIAL

## 2020-01-29 DIAGNOSIS — F33.1 MODERATE RECURRENT MAJOR DEPRESSION (H): ICD-10-CM

## 2020-01-29 DIAGNOSIS — F41.1 GENERALIZED ANXIETY DISORDER: Primary | ICD-10-CM

## 2020-01-29 PROCEDURE — 90834 PSYTX W PT 45 MINUTES: CPT | Performed by: MARRIAGE & FAMILY THERAPIST
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
## 2020-01-30 NOTE — PATIENT INSTRUCTIONS
Patient states that her goals for the next week include: continuing to establish and enforce boundaries with her boyfriend and daughter and continue connecting with positive social supports (at least 1x outside of work during next week).

## 2020-02-06 ENCOUNTER — OFFICE VISIT (OUTPATIENT)
Dept: PSYCHOLOGY | Facility: CLINIC | Age: 40
End: 2020-02-06
Payer: COMMERCIAL

## 2020-02-06 DIAGNOSIS — F41.1 GENERALIZED ANXIETY DISORDER: Primary | ICD-10-CM

## 2020-02-06 DIAGNOSIS — F33.1 MODERATE RECURRENT MAJOR DEPRESSION (H): ICD-10-CM

## 2020-02-06 PROCEDURE — 90834 PSYTX W PT 45 MINUTES: CPT | Performed by: MARRIAGE & FAMILY THERAPIST

## 2020-02-07 NOTE — PATIENT INSTRUCTIONS
Patient reports that her goals for the next week include:  1. Have a conversation w/daughter about the importance of being honest and identify why it's important to patient to be open and honest  2. Establish firmer boundaries with daughter and resist confiding in her about adult relationship issues.
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
## 2020-02-12 ENCOUNTER — OFFICE VISIT (OUTPATIENT)
Dept: PSYCHOLOGY | Facility: CLINIC | Age: 40
End: 2020-02-12
Payer: COMMERCIAL

## 2020-02-12 DIAGNOSIS — F33.1 MODERATE RECURRENT MAJOR DEPRESSION (H): ICD-10-CM

## 2020-02-12 DIAGNOSIS — F41.1 GENERALIZED ANXIETY DISORDER: Primary | ICD-10-CM

## 2020-02-12 PROCEDURE — 90834 PSYTX W PT 45 MINUTES: CPT | Performed by: MARRIAGE & FAMILY THERAPIST
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
## 2020-02-13 NOTE — PATIENT INSTRUCTIONS
Patient's homework includes: review Conflict Resolution skills handout w/daughter; patient also reports that she wants to continue to set boundaries with family members.

## 2020-02-19 ENCOUNTER — OFFICE VISIT (OUTPATIENT)
Dept: PSYCHOLOGY | Facility: CLINIC | Age: 40
End: 2020-02-19
Payer: COMMERCIAL

## 2020-02-19 DIAGNOSIS — F33.1 MODERATE RECURRENT MAJOR DEPRESSION (H): Primary | ICD-10-CM

## 2020-02-19 DIAGNOSIS — F41.1 GENERALIZED ANXIETY DISORDER: ICD-10-CM

## 2020-02-19 PROCEDURE — 90834 PSYTX W PT 45 MINUTES: CPT | Performed by: MARRIAGE & FAMILY THERAPIST

## 2020-02-20 NOTE — PATIENT INSTRUCTIONS
"Patient's homework includes: continuing to practice a \"soft start-up\" when approaching difficult discussions and maintaining healthy boundaries with boyfriend.  "

## 2020-02-26 ENCOUNTER — OFFICE VISIT (OUTPATIENT)
Dept: PSYCHOLOGY | Facility: CLINIC | Age: 40
End: 2020-02-26
Payer: COMMERCIAL

## 2020-02-26 DIAGNOSIS — F41.1 GENERALIZED ANXIETY DISORDER: Primary | ICD-10-CM

## 2020-02-26 DIAGNOSIS — F33.1 MODERATE RECURRENT MAJOR DEPRESSION (H): ICD-10-CM

## 2020-02-26 PROCEDURE — 90834 PSYTX W PT 45 MINUTES: CPT | Performed by: MARRIAGE & FAMILY THERAPIST

## 2020-02-26 ASSESSMENT — ANXIETY QUESTIONNAIRES
5. BEING SO RESTLESS THAT IT IS HARD TO SIT STILL: NEARLY EVERY DAY
7. FEELING AFRAID AS IF SOMETHING AWFUL MIGHT HAPPEN: NOT AT ALL
2. NOT BEING ABLE TO STOP OR CONTROL WORRYING: SEVERAL DAYS
4. TROUBLE RELAXING: SEVERAL DAYS
3. WORRYING TOO MUCH ABOUT DIFFERENT THINGS: MORE THAN HALF THE DAYS
GAD7 TOTAL SCORE: 11
IF YOU CHECKED OFF ANY PROBLEMS ON THIS QUESTIONNAIRE, HOW DIFFICULT HAVE THESE PROBLEMS MADE IT FOR YOU TO DO YOUR WORK, TAKE CARE OF THINGS AT HOME, OR GET ALONG WITH OTHER PEOPLE: SOMEWHAT DIFFICULT
6. BECOMING EASILY ANNOYED OR IRRITABLE: MORE THAN HALF THE DAYS
1. FEELING NERVOUS, ANXIOUS, OR ON EDGE: MORE THAN HALF THE DAYS

## 2020-02-26 ASSESSMENT — PATIENT HEALTH QUESTIONNAIRE - PHQ9: SUM OF ALL RESPONSES TO PHQ QUESTIONS 1-9: 5
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
## 2020-02-26 NOTE — PATIENT INSTRUCTIONS
Patient states that her goals for the next two weeks include:  1. Set up inversion table  2. Move upstairs  3. Continue to set and establish boundaries with daughter and pt's boyfriend

## 2020-02-27 ASSESSMENT — ANXIETY QUESTIONNAIRES: GAD7 TOTAL SCORE: 11

## 2020-03-11 ENCOUNTER — OFFICE VISIT (OUTPATIENT)
Dept: PSYCHOLOGY | Facility: CLINIC | Age: 40
End: 2020-03-11
Payer: COMMERCIAL

## 2020-03-11 DIAGNOSIS — F41.1 GENERALIZED ANXIETY DISORDER: Primary | ICD-10-CM

## 2020-03-11 DIAGNOSIS — F33.1 MODERATE RECURRENT MAJOR DEPRESSION (H): ICD-10-CM

## 2020-03-11 PROCEDURE — 90834 PSYTX W PT 45 MINUTES: CPT | Performed by: MARRIAGE & FAMILY THERAPIST
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
## 2020-03-11 NOTE — PATIENT INSTRUCTIONS
Patient's homework: read provided handout from Altaf Lutz's book [employer].  Patient's goals for the next two weeks include:  1. Set up inversion table  2. Try to separate from ex-bf  3. Keep asking for what she wants

## 2020-03-16 ENCOUNTER — VIRTUAL VISIT (OUTPATIENT)
Dept: FAMILY MEDICINE | Facility: OTHER | Age: 40
End: 2020-03-16
Payer: COMMERCIAL

## 2020-03-16 ENCOUNTER — TELEPHONE (OUTPATIENT)
Dept: FAMILY MEDICINE | Facility: OTHER | Age: 40
End: 2020-03-16

## 2020-03-16 DIAGNOSIS — N39.0 URINARY TRACT INFECTION WITHOUT HEMATURIA, SITE UNSPECIFIED: ICD-10-CM

## 2020-03-16 DIAGNOSIS — R39.9 UTI SYMPTOMS: ICD-10-CM

## 2020-03-16 DIAGNOSIS — R35.0 URINARY FREQUENCY: Primary | ICD-10-CM

## 2020-03-16 DIAGNOSIS — B37.31 YEAST INFECTION OF THE VAGINA: ICD-10-CM

## 2020-03-16 DIAGNOSIS — R39.9 UTI SYMPTOMS: Primary | ICD-10-CM

## 2020-03-16 LAB
ALBUMIN UR-MCNC: 30 MG/DL
APPEARANCE UR: ABNORMAL
BILIRUB UR QL STRIP: NEGATIVE
COLOR UR AUTO: ABNORMAL
GLUCOSE UR STRIP-MCNC: NEGATIVE MG/DL
HCG UR QL: NEGATIVE
HGB UR QL STRIP: ABNORMAL
KETONES UR STRIP-MCNC: NEGATIVE MG/DL
LEUKOCYTE ESTERASE UR QL STRIP: ABNORMAL
NITRATE UR QL: POSITIVE
PH UR STRIP: 7 PH (ref 5–7)
RBC #/AREA URNS AUTO: 16 /HPF (ref 0–2)
SOURCE: ABNORMAL
SP GR UR STRIP: 1.01 (ref 1–1.03)
SPECIMEN SOURCE: ABNORMAL
SQUAMOUS #/AREA URNS AUTO: 1 /HPF (ref 0–1)
UROBILINOGEN UR STRIP-MCNC: 4 MG/DL (ref 0–2)
WBC #/AREA URNS AUTO: 377 /HPF (ref 0–5)
WBC CLUMPS #/AREA URNS HPF: PRESENT /HPF
WET PREP SPEC: ABNORMAL

## 2020-03-16 PROCEDURE — 87088 URINE BACTERIA CULTURE: CPT | Performed by: PHYSICIAN ASSISTANT

## 2020-03-16 PROCEDURE — 81001 URINALYSIS AUTO W/SCOPE: CPT | Performed by: PHYSICIAN ASSISTANT

## 2020-03-16 PROCEDURE — 87086 URINE CULTURE/COLONY COUNT: CPT | Performed by: PHYSICIAN ASSISTANT

## 2020-03-16 PROCEDURE — 87210 SMEAR WET MOUNT SALINE/INK: CPT | Performed by: PHYSICIAN ASSISTANT

## 2020-03-16 PROCEDURE — 87186 SC STD MICRODIL/AGAR DIL: CPT | Performed by: PHYSICIAN ASSISTANT

## 2020-03-16 PROCEDURE — 81025 URINE PREGNANCY TEST: CPT | Performed by: PHYSICIAN ASSISTANT

## 2020-03-16 PROCEDURE — 99441 C NONPHYSICIAN TELEPHONE ASSESSMENT 5-10 MIN: CPT | Performed by: PHYSICIAN ASSISTANT

## 2020-03-16 RX ORDER — FLUCONAZOLE 150 MG/1
150 TABLET ORAL ONCE
Qty: 2 TABLET | Refills: 0 | Status: SHIPPED | OUTPATIENT
Start: 2020-03-16 | End: 2020-07-09

## 2020-03-16 RX ORDER — SULFAMETHOXAZOLE/TRIMETHOPRIM 800-160 MG
1 TABLET ORAL 2 TIMES DAILY
Qty: 14 TABLET | Refills: 0 | Status: SHIPPED | OUTPATIENT
Start: 2020-03-16 | End: 2020-06-18

## 2020-03-16 NOTE — TELEPHONE ENCOUNTER
Reason for call:  Patient reporting a symptom    Symptom or request: Poss uti  Uses [facility]     Duration (how long have symptoms been present): yesterday am    Have you been treated for this before? Yes    Additional comments: none    Phone Number patient can be reached at:  Home number on file [phone] (home)    Best Time:  anytime    Can we leave a detailed message on this number:  YES    Call taken on 3/16/2020 at 9:33 AM by Dc Elliott

## 2020-03-16 NOTE — TELEPHONE ENCOUNTER
Attempted to call pt but number was busy. Will try again later.    Kiara Dorsey CMA ([facility])

## 2020-03-16 NOTE — TELEPHONE ENCOUNTER
Phone or evisit is fine I prefer a urine sample and wet prep if having vaginal symptoms.  Llyy Zayas PA-C

## 2020-03-16 NOTE — PROGRESS NOTES
"Cornelia Mauricio is a 39 year old female who is being evaluated via a billable telephone visit.      The patient has been notified of following:     \"This telephone visit will be conducted via a call between you and your physician/provider. We have found that certain health care needs can be provided without the need for a physical exam.  This service lets us provide the care you need with a short phone conversation.  If a prescription is necessary we can send it directly to your pharmacy.  If lab work is needed we can place an order for that and you can then stop by our lab to have the test done at a later time.    If during the course of the call the physician/provider feels a telephone visit is not appropriate, you will not be charged for this service.\"       Cornelia Mauricio complains of    Chief Complaint   Patient presents with     UTI       I have reviewed and updated the patient's Past Medical History, Social History, Family History and Medication List.    ALLERGIES  No known drug allergies    Kiara Dorsey CMA ([facility])   (MA signature)    Additional provider notes:   Patient started having symptoms yesterday morning she has dysuria urinary frequency and urgency.  Azo over-the-counter is not even helping.  She does have vaginal itching.  No vaginal discharge fever chills, lower abdominal pain or flank pain.  She is been increasing fluids and taking Azo but still urinating a lot more than typical.  She is sexually active but is not using any contraception.  UA is indicative of infection she has yeast seen on wet prep and negative pregnancy test  Assessment/Plan:  (R35.0) Urinary frequency  (primary encounter diagnosis)  Comment:   Plan: sulfamethoxazole-trimethoprim (BACTRIM DS)         800-160 MG tablet, NONPHYSICIAN TELEPHONE         ASSESSMENT 5-10 MIN, CANCELED: HCG Qual, Urine         ([id number])            (N39.0) Urinary tract infection without hematuria, site unspecified  Comment: Increase fluids, urinate " frequently  Plan: sulfamethoxazole-trimethoprim (BACTRIM DS)         800-160 MG tablet, NONPHYSICIAN TELEPHONE         ASSESSMENT 5-10 MIN        Antibiotics as above    (B37.3) Yeast infection of the vagina  Comment: Diflucan as prescribed may take another 1 after antibiotics are gone if needed  Plan: fluconazole (DIFLUCAN) 150 MG tablet,         NONPHYSICIAN TELEPHONE ASSESSMENT 5-10 MIN              I have reviewed the note as documented above.  This accurately captures the substance of my conversation with the patient.  Electronically signed by Lyly Zayas PA-C     Phone call contact time  Call Started at 3:39-3:42 initially   Call Ended at 4:17-4:19 total 5 minutes     Lyly Zayas PA-C

## 2020-03-16 NOTE — TELEPHONE ENCOUNTER
Spoke with pt and she is scheduled for phone visit today.    Kiara Dorsey CMA ([facility])

## 2020-03-18 LAB
BACTERIA SPEC CULT: ABNORMAL
Lab: ABNORMAL
SPECIMEN SOURCE: ABNORMAL

## 2020-03-23 ENCOUNTER — TELEPHONE (OUTPATIENT)
Dept: PSYCHOLOGY | Facility: CLINIC | Age: 40
End: 2020-03-23

## 2020-03-24 ENCOUNTER — VIRTUAL VISIT (OUTPATIENT)
Dept: PSYCHOLOGY | Facility: CLINIC | Age: 40
End: 2020-03-24
Payer: COMMERCIAL

## 2020-03-24 DIAGNOSIS — F41.1 GENERALIZED ANXIETY DISORDER: Primary | ICD-10-CM

## 2020-03-24 DIAGNOSIS — F33.1 MODERATE RECURRENT MAJOR DEPRESSION (H): ICD-10-CM

## 2020-03-24 PROCEDURE — 90834 PSYTX W PT 45 MINUTES: CPT | Mod: TEL | Performed by: MARRIAGE & FAMILY THERAPIST

## 2020-03-25 NOTE — PATIENT INSTRUCTIONS
"Patient reports that her goals for the next two weeks include:  1. Continue spring cleaning and include daughter in process  2. \"Be healthier\" and continue using Apple Cider Vinegar (ACV); daughter has offered to cook  3. Continue to use inversion table 1-3x/day  "
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
## 2020-04-07 ENCOUNTER — VIRTUAL VISIT (OUTPATIENT)
Dept: PSYCHOLOGY | Facility: CLINIC | Age: 40
End: 2020-04-07
Payer: COMMERCIAL

## 2020-04-07 DIAGNOSIS — F33.1 MODERATE RECURRENT MAJOR DEPRESSION (H): ICD-10-CM

## 2020-04-07 DIAGNOSIS — F41.1 GENERALIZED ANXIETY DISORDER: Primary | ICD-10-CM

## 2020-04-07 PROCEDURE — 90837 PSYTX W PT 60 MINUTES: CPT | Mod: 95 | Performed by: MARRIAGE & FAMILY THERAPIST

## 2020-04-08 NOTE — PATIENT INSTRUCTIONS
Patient states that her goals for the next two weeks include:  1. Continue to use inversion table 2-3x/week  2. Return to drinking ACV water and try to eat at least 2-3 healthy meals/week  3. Improve overall daily water intake
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
## 2020-04-21 ENCOUNTER — VIRTUAL VISIT (OUTPATIENT)
Dept: PSYCHOLOGY | Facility: CLINIC | Age: 40
End: 2020-04-21
Payer: COMMERCIAL

## 2020-04-21 DIAGNOSIS — F41.1 GENERALIZED ANXIETY DISORDER: Primary | ICD-10-CM

## 2020-04-21 DIAGNOSIS — F33.1 MODERATE RECURRENT MAJOR DEPRESSION (H): ICD-10-CM

## 2020-04-21 PROCEDURE — 90837 PSYTX W PT 60 MINUTES: CPT | Mod: 95 | Performed by: MARRIAGE & FAMILY THERAPIST
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
## 2020-04-22 NOTE — PATIENT INSTRUCTIONS
Patient states that her goals for the next two weeks include:  1. Continue to use inversion table near-daily  2. Return to drinking ACV water and try to eat at least 2-3 healthy meals/week (continued)  3. Improve overall daily water intake    Pt also states that she is thinking about buying blackout curtains to help with her sleep.

## 2020-05-05 ENCOUNTER — VIRTUAL VISIT (OUTPATIENT)
Dept: PSYCHOLOGY | Facility: CLINIC | Age: 40
End: 2020-05-05
Payer: COMMERCIAL

## 2020-05-05 DIAGNOSIS — F33.1 MODERATE RECURRENT MAJOR DEPRESSION (H): ICD-10-CM

## 2020-05-05 DIAGNOSIS — F41.1 GENERALIZED ANXIETY DISORDER: Primary | ICD-10-CM

## 2020-05-05 PROCEDURE — 90837 PSYTX W PT 60 MINUTES: CPT | Mod: 95 | Performed by: MARRIAGE & FAMILY THERAPIST

## 2020-05-05 ASSESSMENT — ANXIETY QUESTIONNAIRES
4. TROUBLE RELAXING: SEVERAL DAYS
5. BEING SO RESTLESS THAT IT IS HARD TO SIT STILL: SEVERAL DAYS
IF YOU CHECKED OFF ANY PROBLEMS ON THIS QUESTIONNAIRE, HOW DIFFICULT HAVE THESE PROBLEMS MADE IT FOR YOU TO DO YOUR WORK, TAKE CARE OF THINGS AT HOME, OR GET ALONG WITH OTHER PEOPLE: SOMEWHAT DIFFICULT
6. BECOMING EASILY ANNOYED OR IRRITABLE: MORE THAN HALF THE DAYS
3. WORRYING TOO MUCH ABOUT DIFFERENT THINGS: SEVERAL DAYS
2. NOT BEING ABLE TO STOP OR CONTROL WORRYING: SEVERAL DAYS
1. FEELING NERVOUS, ANXIOUS, OR ON EDGE: SEVERAL DAYS
7. FEELING AFRAID AS IF SOMETHING AWFUL MIGHT HAPPEN: NOT AT ALL
GAD7 TOTAL SCORE: 7

## 2020-05-05 ASSESSMENT — PATIENT HEALTH QUESTIONNAIRE - PHQ9: SUM OF ALL RESPONSES TO PHQ QUESTIONS 1-9: 5
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
## 2020-05-05 NOTE — PATIENT INSTRUCTIONS
Patient states that her goals for the next two weeks include:  1. Continue to use inversion table at least 3x/week  2. Get back on track with healthy eating/portion sizes/food choices   3. Continue with Apple Cider Vinegar  4. Make a list of projects to be completed in the house and yard  5. Get Mom's car running (charge or replace battery) before she arrives at the end of May

## 2020-05-06 ASSESSMENT — ANXIETY QUESTIONNAIRES: GAD7 TOTAL SCORE: 7

## 2020-05-19 ENCOUNTER — VIRTUAL VISIT (OUTPATIENT)
Dept: PSYCHOLOGY | Facility: CLINIC | Age: 40
End: 2020-05-19
Payer: COMMERCIAL

## 2020-05-19 DIAGNOSIS — F33.1 MODERATE RECURRENT MAJOR DEPRESSION (H): ICD-10-CM

## 2020-05-19 DIAGNOSIS — F41.1 GENERALIZED ANXIETY DISORDER: Primary | ICD-10-CM

## 2020-05-19 PROCEDURE — 90834 PSYTX W PT 45 MINUTES: CPT | Mod: 95 | Performed by: MARRIAGE & FAMILY THERAPIST

## 2020-05-19 NOTE — PATIENT INSTRUCTIONS
Patient states that her goals for the next two weeks include:  1. Continue to use inversion table at least 3x/week  2. Establish weekly family meeting to discuss good/bad from past week; patient states that she will take notes and document minutes from discussion  3. Continue using Apple Cider Vinegar and improving nutrition
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
## 2020-06-02 ENCOUNTER — VIRTUAL VISIT (OUTPATIENT)
Dept: PSYCHOLOGY | Facility: CLINIC | Age: 40
End: 2020-06-02
Payer: COMMERCIAL

## 2020-06-02 DIAGNOSIS — F33.1 MODERATE RECURRENT MAJOR DEPRESSION (H): ICD-10-CM

## 2020-06-02 DIAGNOSIS — F41.1 GENERALIZED ANXIETY DISORDER: Primary | ICD-10-CM

## 2020-06-02 PROCEDURE — 90834 PSYTX W PT 45 MINUTES: CPT | Mod: 95 | Performed by: MARRIAGE & FAMILY THERAPIST
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
## 2020-06-02 NOTE — PATIENT INSTRUCTIONS
Patient states that her goals for the next two weeks include:  1. Get back to using inversion table at least 1x week  2. Keep setting boundaries with ex-bf and his daughter  3. Continue drinking ACV water    Writer encouraged patient to connect with her co-worker/friend.

## 2020-06-17 ENCOUNTER — TELEPHONE (OUTPATIENT)
Dept: FAMILY MEDICINE | Facility: OTHER | Age: 40
End: 2020-06-17

## 2020-06-17 NOTE — TELEPHONE ENCOUNTER
----- Message from Lyly Morris sent at 6/17/2020  7:58 AM CDT -----  Regarding: Patient request to schedule  Jayant Desouza,    I talked with Cornelia this morning (she is at work), and she is struggling with PMS-related extreme anxiety and irritability.  She asked that I message you to refer her for medication options.  Would you or your  be able to reach out to her today?    Thank you!    QUENTIN Saenz, [facility]

## 2020-06-18 ENCOUNTER — TELEPHONE (OUTPATIENT)
Dept: PSYCHOLOGY | Facility: CLINIC | Age: 40
End: 2020-06-18

## 2020-06-18 ENCOUNTER — VIRTUAL VISIT (OUTPATIENT)
Dept: FAMILY MEDICINE | Facility: OTHER | Age: 40
End: 2020-06-18
Payer: COMMERCIAL

## 2020-06-18 DIAGNOSIS — F43.23 ADJUSTMENT DISORDER WITH MIXED ANXIETY AND DEPRESSED MOOD: Primary | ICD-10-CM

## 2020-06-18 PROCEDURE — 99213 OFFICE O/P EST LOW 20 MIN: CPT | Mod: 95 | Performed by: PHYSICIAN ASSISTANT

## 2020-06-18 PROCEDURE — 96127 BRIEF EMOTIONAL/BEHAV ASSMT: CPT | Mod: 95 | Performed by: PHYSICIAN ASSISTANT

## 2020-06-18 RX ORDER — BUSPIRONE HYDROCHLORIDE 5 MG/1
5 TABLET ORAL 3 TIMES DAILY
Qty: 45 TABLET | Refills: 0 | Status: SHIPPED | OUTPATIENT
Start: 2020-06-18 | End: 2020-11-12 | Stop reason: DRUGHIGH

## 2020-06-18 ASSESSMENT — ANXIETY QUESTIONNAIRES
IF YOU CHECKED OFF ANY PROBLEMS ON THIS QUESTIONNAIRE, HOW DIFFICULT HAVE THESE PROBLEMS MADE IT FOR YOU TO DO YOUR WORK, TAKE CARE OF THINGS AT HOME, OR GET ALONG WITH OTHER PEOPLE: SOMEWHAT DIFFICULT
1. FEELING NERVOUS, ANXIOUS, OR ON EDGE: MORE THAN HALF THE DAYS
3. WORRYING TOO MUCH ABOUT DIFFERENT THINGS: NOT AT ALL
GAD7 TOTAL SCORE: 6
7. FEELING AFRAID AS IF SOMETHING AWFUL MIGHT HAPPEN: NOT AT ALL
2. NOT BEING ABLE TO STOP OR CONTROL WORRYING: SEVERAL DAYS
5. BEING SO RESTLESS THAT IT IS HARD TO SIT STILL: NOT AT ALL
6. BECOMING EASILY ANNOYED OR IRRITABLE: NEARLY EVERY DAY

## 2020-06-18 ASSESSMENT — PATIENT HEALTH QUESTIONNAIRE - PHQ9
5. POOR APPETITE OR OVEREATING: NOT AT ALL
SUM OF ALL RESPONSES TO PHQ QUESTIONS 1-9: 5

## 2020-06-18 NOTE — TELEPHONE ENCOUNTER
Patient asked for assistance scheduling a same-day physician appointment; patient was able to schedule with virtual provider at 2:20 p.m. on this date.

## 2020-06-18 NOTE — PROGRESS NOTES
"Cornelia Mauricio is a 39 year old female who is being evaluated via a billable video visit.      The patient has been notified of following:     \"This video visit will be conducted via a call between you and your physician/provider. We have found that certain health care needs can be provided without the need for an in-person physical exam.  This service lets us provide the care you need with a video conversation.  If a prescription is necessary we can send it directly to your pharmacy.  If lab work is needed we can place an order for that and you can then stop by our lab to have the test done at a later time.    Video visits are billed at different rates depending on your insurance coverage.  Please reach out to your insurance provider with any questions.    If during the course of the call the physician/provider feels a video visit is not appropriate, you will not be charged for this service.\"    Patient has given verbal consent for Video visit? Yes    How would you like to obtain your AVS? Mail a copy  Patient would like the video invitation sent by: Text to cell phone: [phone]    Will anyone else be joining your video visit? No      Subjective     Corneila Mauricio is a 39 year old female who presents today via video visit for the following health issues:    HPI  Depression and Anxiety Follow-Up    How are you doing with your depression since your last visit? Improved     How are you doing with your anxiety since your last visit?  Worsened    Are you having other symptoms that might be associated with depression or anxiety? Yes:  patient feels anger and frustration .    Have you had a significant life event? No     Do you have any concerns with your use of alcohol or other drugs? No patient states she drinks but not every day. Patient has 4 beers and 1 hard beverage when she does drink.   Patient states her PMS has increased her irritability. Patient states her PMS symptoms have been bad the last two months. " Patient has been dealing with parent/ child conflict as her daughter is a teenager.    Social History     Tobacco Use     Smoking status: Never Smoker     Smokeless tobacco: Never Used   Substance Use Topics     Alcohol use: Yes     Comment: 1-2 days per month has 1-2 beers     Drug use: No     PHQ 12/3/2019 2/26/2020 5/5/2020   PHQ-9 Total Score 9 5 5   Q9: Thoughts of better off dead/self-harm past 2 weeks Not at all Not at all Not at all   F/U: Thoughts of suicide or self-harm - - -   F/U: Safety concerns - - -     TYSHAWN-7 SCORE 12/3/2019 2/26/2020 5/5/2020   Total Score - - -   Total Score - - -   Total Score 8 11 7     Last PHQ-9 6/18/2020   1.  Little interest or pleasure in doing things 1   2.  Feeling down, depressed, or hopeless 0   3.  Trouble falling or staying asleep, or sleeping too much 0   4.  Feeling tired or having little energy 3   5.  Poor appetite or overeating 0   6.  Feeling bad about yourself 0   7.  Trouble concentrating 1   8.  Moving slowly or restless 0   Q9: Thoughts of better off dead/self-harm past 2 weeks 0   PHQ-9 Total Score 5   Difficulty at work, home, or with people Not difficult at all   In the past two weeks have you had thoughts of suicide or self harm? -   Do you have concerns about your personal safety or the safety of others? -     TYSHAWN-7  6/18/2020   1. Feeling nervous, anxious, or on edge 2   2. Not being able to stop or control worrying 1   3. Worrying too much about different things 0   4. Trouble relaxing 0   5. Being so restless that it is hard to sit still 0   6. Becoming easily annoyed or irritable 3   7. Feeling afraid, as if something awful might happen 0   TYSHAWN-7 Total Score 6   If you checked any problems, how difficult have they made it for you to do your work, take care of things at home, or get along with other people? Somewhat difficult     Suicide Assessment Five-step Evaluation and Treatment (SAFE-T)      How many servings of fruits and vegetables do you eat  "daily?  2-3    On average, how many sweetened beverages do you drink each day (Examples: soda, juice, sweet tea, etc.  Do NOT count diet or artificially sweetened beverages)?   0    How many days per week do you exercise enough to make your heart beat faster? 3 or less    How many minutes a day do you exercise enough to make your heart beat faster? 30 - 60    How many days per week do you miss taking your medication? 0 patient is currently not taking any medications.         Video Start Time: 1:28 PM            Reviewed and updated as needed this visit by Provider         Review of Systems   Constitutional, HEENT, cardiovascular, pulmonary, gi and gu systems are negative, except as otherwise noted.      Objective    There were no vitals taken for this visit.  Estimated body mass index is 27.63 kg/m  as calculated from the following:    Height as of 6/13/19: 1.6 m (5' 3\").    Weight as of 6/13/19: 70.8 kg (156 lb).  Physical Exam     GENERAL: Healthy, alert and no distress  EYES: Eyes grossly normal to inspection.  No discharge or erythema, or obvious scleral/conjunctival abnormalities.  RESP: No audible wheeze, cough, or visible cyanosis.  No visible retractions or increased work of breathing.    SKIN: Visible skin clear. No significant rash, abnormal pigmentation or lesions.  NEURO: Cranial nerves grossly intact.  Mentation and speech appropriate for age.  PSYCH: Mentation appears normal, affect normal/bright, judgement and insight intact, normal speech and appearance well-groomed.      Diagnostic Test Results:  Labs reviewed in Epic        Assessment & Plan     1. Adjustment disorder with mixed anxiety and depressed mood  Patient has tried different serotonin specific reuptake inhibitor's in the past but did not care for these. She also has tried OCP's but it seemed this made symptoms worse. She reports mainly just anxiety and irritability. Has been working with therapy but feels she needs a little more help. Will " have patient trial Buspar. She will take this 2-3 times daily. Discussed appropriate use and side effects in great detail. Follow-up in 4-6 weeks, sooner if needed recommended.   - busPIRone (BUSPAR) 5 MG tablet; Take 1 tablet (5 mg) by mouth 3 times daily  Dispense: 45 tablet; Refill: 0     The patient indicates understanding of these issues and agrees with the plan.    Tiffani Krishnan PA-C  [facility]      Video-Visit Details    Type of service:  Video Visit    Video End Time: 1:37 PM    Originating Location (pt. Location): Home    Distant Location (provider location):  [facility]     Platform used for Video Visit: Charitas    No follow-ups on file.       Tiffani Krishnan PA-C

## 2020-06-19 ASSESSMENT — ANXIETY QUESTIONNAIRES: GAD7 TOTAL SCORE: 6

## 2020-06-30 ENCOUNTER — VIRTUAL VISIT (OUTPATIENT)
Dept: PSYCHOLOGY | Facility: CLINIC | Age: 40
End: 2020-06-30
Payer: COMMERCIAL

## 2020-06-30 DIAGNOSIS — F41.1 GENERALIZED ANXIETY DISORDER: Primary | ICD-10-CM

## 2020-06-30 DIAGNOSIS — F33.1 MODERATE RECURRENT MAJOR DEPRESSION (H): ICD-10-CM

## 2020-06-30 PROCEDURE — 90834 PSYTX W PT 45 MINUTES: CPT | Mod: 95 | Performed by: MARRIAGE & FAMILY THERAPIST
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
## 2020-06-30 NOTE — PATIENT INSTRUCTIONS
"  Patient states that her goals for the next week include:  1. Get back to using inversion table at least 1x week (continued)  2. Enjoy time at cabin this weekend and don't let \"little stuff upset me\"  3. Get back to regularly drinking ACV water    Patient requested a session tomorrow (7/1/2020) as well to continue discussing recent relationship issues.  "

## 2020-07-01 ENCOUNTER — VIRTUAL VISIT (OUTPATIENT)
Dept: PSYCHOLOGY | Facility: CLINIC | Age: 40
End: 2020-07-01
Payer: COMMERCIAL

## 2020-07-01 DIAGNOSIS — F33.1 MODERATE RECURRENT MAJOR DEPRESSION (H): ICD-10-CM

## 2020-07-01 DIAGNOSIS — F41.1 GENERALIZED ANXIETY DISORDER: Primary | ICD-10-CM

## 2020-07-01 PROCEDURE — 90834 PSYTX W PT 45 MINUTES: CPT | Mod: 95 | Performed by: MARRIAGE & FAMILY THERAPIST

## 2020-07-01 NOTE — PATIENT INSTRUCTIONS
"Patient states that her goals for the next two weeks include:    1. Get back to using inversion table at least 1x week (continued)  2. Enjoy time at cabin this weekend and don't let \"little stuff upset me\"  3. Get back to regularly drinking ACV water  4. Set clear boundaries with sig. other regarding plan for moving in together  "
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
## 2020-07-07 ENCOUNTER — NURSE TRIAGE (OUTPATIENT)
Dept: NURSING | Facility: CLINIC | Age: 40
End: 2020-07-07

## 2020-07-07 NOTE — TELEPHONE ENCOUNTER
"\"I think I have a bug bite that has grown to the size of a quarter in less than 24 hrs. About 12 noon today there is now a thin white ring around it and then there is another thin red ring\". She is not sure if it was a tick bite. No drainage. She states there is a raised lump. It is not tender. It itches really bad. No fever. \"It is on the tender part of my left forearm.\" She had a purple bruise and a scratch in this area yesterday. No bruising noted today.   She has a smart phone and will take a picture of the site. She is currently unable to access [facility].   Triaged to a disposition of See PCP within 24 hrs. Discussed option of UC if desired.   Call transferred to .    Additional Information    Negative: Sounds like a life-threatening emergency to the triager    Negative: Not a tick bite    Negative: [1] 2 to 14 days following tick bite AND [2] severe headache with fever occurs    Negative: [1] 2 to 14 days following tick bite AND [2] widespread rash with fever occurs    Negative: Patient sounds very sick or weak to the triager    Negative: [1] Fever AND [2] red area    Negative: [1] Fever AND [2] area is very tender to touch    Negative: [1] Red streak or red line AND [2] length > 2 inches (5 cm)    Negative: Can't remove live tick (after trying Care Advice)    Negative: Can't remove tick's head that was broken off in the skin (after trying Care Advice)    Negative: [1] 2 to 14 days following tick bite AND [2] fever AND [3] no rash or headache    Negative: [1] 2 to 14 days following tick bite AND [2] widespread rash or headache AND [3] no fever    Negative: [1] Red or very tender (to touch) area AND [2] started over 24 hours after the bite    Red ring or bull's-eye rash occurs at tick bite    Protocols used: TICK BITE-A-AH  COVID 19 Nurse Triage Plan/Patient Instructions    Please be aware that novel coronavirus (COVID-19) may be circulating in the community. If you develop symptoms such as fever, " cough, or SOB or if you have concerns about the presence of another infection including coronavirus (COVID-19), please contact your health care provider or visit [email or web address].     Disposition/Instructions    Patient to schedule an In Person Visit with provider. Reference Visit Selection Guide.    Thank you for taking steps to prevent the spread of this virus.  o Limit your contact with others.  o Wear a simple mask to cover your cough.  o Wash your hands well and often.    Resources    [facility]: About COVID-19: [email or web address]    CDC: What to Do If You're Sick: www.cdc.gov/coronavirus/2019-ncov/about/steps-when-sick.html    CDC: Ending Home Isolation: www.cdc.gov/coronavirus/2019-ncov/hcp/disposition-in-home-patients.html     CDC: Caring for Someone: www.cdc.gov/coronavirus/2019-ncov/if-you-are-sick/care-for-someone.html     [facility]: Interim Guidance for Hospital Discharge to Home: www.[email or web address]./diseases/coronavirus/hcp/hospdischarge.pdf    [facility] clinical trials (COVID-19 research studies): [email or web address]-clinical-trials     Below are the COVID-19 hotlines at the [place] Department of Health ([facility]). Interpreters are available.   o For health questions: Call 895-971-3692 or 1-608.960.1344 (7 a.m. to 7 p.m.)  o For questions about schools and childcare: Call 503-320-4888 or 1-386.648.2429 (7 a.m. to 7 p.m.)

## 2020-07-08 ENCOUNTER — VIRTUAL VISIT (OUTPATIENT)
Dept: FAMILY MEDICINE | Facility: OTHER | Age: 40
End: 2020-07-08
Payer: COMMERCIAL

## 2020-07-08 DIAGNOSIS — L03.114 CELLULITIS OF LEFT UPPER EXTREMITY: Primary | ICD-10-CM

## 2020-07-08 PROCEDURE — 99213 OFFICE O/P EST LOW 20 MIN: CPT | Mod: 95 | Performed by: PHYSICIAN ASSISTANT

## 2020-07-08 RX ORDER — DOXYCYCLINE HYCLATE 100 MG
100 TABLET ORAL 2 TIMES DAILY
Qty: 20 TABLET | Refills: 0 | Status: SHIPPED | OUTPATIENT
Start: 2020-07-08 | End: 2020-07-18

## 2020-07-08 NOTE — PATIENT INSTRUCTIONS
Patient Education     Cellulitis  Cellulitis is an infection of the deep layers of skin. A break in the skin, such as a cut or scratch, can let bacteria under the skin. If the bacteria get to deep layers of the skin, it can be serious. If not treated, cellulitis can get into the bloodstream and lymph nodes. The infection can then spread throughout the body. This causes serious illness.  Cellulitis causes the affected skin to become red, swollen, warm, and sore. The reddened areas have a visible border. An open sore may leak fluid (pus). You may have a fever, chills, and pain.  Cellulitis is treated with antibiotics taken for 7 to 10 days. An open sore may be cleaned and covered with cool wet gauze. Symptoms should get better 1 to 2 days after treatment is started. Make sure to take all the antibiotics for the full number of days until they are gone. Keep taking the medicine even if your symptoms go away.  Home care  Follow these tips:    Limit the use of the part of your body with cellulitis.     If the infection is on your leg, keep your leg raised while sitting. This will help to reduce swelling.    Take all of the antibiotic medicine exactly as directed until it is gone. Do not miss any doses, especially during the first 7 days. Don t stop taking the medicine when your symptoms get better.    Keep the affected area clean and dry.    Wash your hands with soap and warm water before and after touching your skin. Anyone else who touches your skin should also wash his or her hands. Don't share towels.  Follow-up care  Follow up with your healthcare provider, or as advised. If your infection does not go away on the first antibiotic, your healthcare provider will prescribe a different one.  When to seek medical advice  Call your healthcare provider right away if any of these occur:    Red areas that spread    Swelling or pain that gets worse    Fluid leaking from the skin (pus)    Fever higher of 100.4  F (38.0  C) or  higher after 2 days on antibiotics  Date Last Reviewed: 9/1/2016 2000-2019 [employer] [address] [place] [address], [place] [address]. All rights reserved. This information is not intended as a substitute for professional medical care. Always follow your healthcare professional's instructions.

## 2020-07-08 NOTE — PROGRESS NOTES
"Cornelia Mauricio is a 39 year old female who is being evaluated via a billable video visit.      The patient has been notified of following:     \"This video visit will be conducted via a call between you and your physician/provider. We have found that certain health care needs can be provided without the need for an in-person physical exam.  This service lets us provide the care you need with a video conversation.  If a prescription is necessary we can send it directly to your pharmacy.  If lab work is needed we can place an order for that and you can then stop by our lab to have the test done at a later time.    Video visits are billed at different rates depending on your insurance coverage.  Please reach out to your insurance provider with any questions.    If during the course of the call the physician/provider feels a video visit is not appropriate, you will not be charged for this service.\"    Patient has given verbal consent for Video visit? Yes  How would you like to obtain your AVS? Maria Esther  Patient would like the video invitation sent by: Text to cell phone: [phone]   * Provider corrected - [phone]  Will anyone else be joining your video visit? No    Subjective     Cornelia Mauricio is a 39 year old female who presents today via video visit for the following health issues:    HPI  Concern - bug bite  Onset: 3 days    Description: left forearm    Intensity: moderate    Progression of Symptoms:  worsening    Accompanying Signs & Symptoms:  Red and swollen    Previous history of similar problem:   no    Precipitating factors:   Worsened by: NA    Alleviating factors:  Improved by: NA  Therapies Tried and outcome: has not tried anything    - Had a ring around it yesterday with central red spot   - [place] in [place], could have been anything including a tic   - Today ring is less prominent and is getting bigger, was a dime and now little bigger than a quarter   - Warm   - No drainage        Video Start " Time: 10:57 AM      Reviewed and updated as needed this visit by Provider  Allergies  Meds  Problems  Med Hx  Surg Hx         Review of Systems   Constitutional, HEENT, cardiovascular, pulmonary, gi and gu systems are negative, except as otherwise noted.      Objective       Physical Exam   GENERAL: Healthy, alert and no distress  EYES: Eyes grossly normal to inspection.  No discharge or erythema, or obvious scleral/conjunctival abnormalities.  RESP: No audible wheeze, cough, or visible cyanosis.  No visible retractions or increased work of breathing.    SKIN:      Left volar forearm   Remainder of visible skin clear. No significant rash, abnormal pigmentation or lesions.  NEURO: Cranial nerves grossly intact.  Mentation and speech appropriate for age.  PSYCH: Mentation appears normal, affect normal/bright, judgement and insight intact, normal speech and appearance well-groomed.      Diagnostic Test Results:  none         Assessment & Plan       ICD-10-CM    1. Cellulitis of left upper extremity  L03.114 doxycycline hyclate (VIBRA-TABS) 100 MG tablet     - Consistent with infection from some sort of bug bite   - Discussed treatment with Doxycycline as would cover for infection and Lyme prophylaxis   - Will do BID for 10 days   - Discussed antibiotic use, duration, and side effects  - Discussed wound care and watching for signs of abscess formation   - Hand out given     The patient indicates understanding of these issues and agrees with the plan.    Return in about 1 week (around 7/15/2020) for if not improving.    Debbie Griffin PA-C  [facility]      Video-Visit Details    Type of service:  Video Visit    Video End Time:11:02 AM    Originating Location (pt. Location): Home    Distant Location (provider location):  [facility]     Platform used for Video Visit: Nas

## 2020-07-09 ENCOUNTER — MYC MEDICAL ADVICE (OUTPATIENT)
Dept: FAMILY MEDICINE | Facility: OTHER | Age: 40
End: 2020-07-09

## 2020-07-09 DIAGNOSIS — B37.31 YEAST INFECTION OF THE VAGINA: ICD-10-CM

## 2020-07-09 RX ORDER — FLUCONAZOLE 150 MG/1
150 TABLET ORAL ONCE
Qty: 2 TABLET | Refills: 0 | Status: SHIPPED | OUTPATIENT
Start: 2020-07-09 | End: 2021-02-10

## 2020-07-09 NOTE — TELEPHONE ENCOUNTER
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    Diflucan is on patient's med list, pended med reorder.    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  Pearl Carr, KRAIGN, RN, PHN

## 2020-07-15 ENCOUNTER — VIRTUAL VISIT (OUTPATIENT)
Dept: PSYCHOLOGY | Facility: CLINIC | Age: 40
End: 2020-07-15
Payer: COMMERCIAL

## 2020-07-15 DIAGNOSIS — F41.1 GENERALIZED ANXIETY DISORDER: Primary | ICD-10-CM

## 2020-07-15 DIAGNOSIS — F33.1 MODERATE RECURRENT MAJOR DEPRESSION (H): ICD-10-CM

## 2020-07-15 PROCEDURE — 90837 PSYTX W PT 60 MINUTES: CPT | Mod: 95 | Performed by: MARRIAGE & FAMILY THERAPIST
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
## 2020-07-15 NOTE — PATIENT INSTRUCTIONS
Patient states that her goals for the next two weeks include:    1. Get back to using inversion chair at least 2x week  2. Do some simple exercises 2-3x/week (planks, weights, etc.)  3. Purchase apple cider vinegar again  4. Buy new ceiling fan (and have electrical issue checked if needed)    Patient also reports that her condenser is currently broken.

## 2020-07-28 ENCOUNTER — MYC MEDICAL ADVICE (OUTPATIENT)
Dept: FAMILY MEDICINE | Facility: OTHER | Age: 40
End: 2020-07-28

## 2020-08-05 ENCOUNTER — VIRTUAL VISIT (OUTPATIENT)
Dept: PSYCHOLOGY | Facility: CLINIC | Age: 40
End: 2020-08-05
Payer: COMMERCIAL

## 2020-08-05 DIAGNOSIS — F41.1 GENERALIZED ANXIETY DISORDER: Primary | ICD-10-CM

## 2020-08-05 DIAGNOSIS — F33.1 MODERATE RECURRENT MAJOR DEPRESSION (H): ICD-10-CM

## 2020-08-05 PROCEDURE — 90834 PSYTX W PT 45 MINUTES: CPT | Mod: 95 | Performed by: MARRIAGE & FAMILY THERAPIST
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
## 2020-08-05 NOTE — PATIENT INSTRUCTIONS
Patient states that her goals for the next week include:    1. Use inversion chair at least 1x/week  2. Communicate better with significant other/manage irritability  3. Continue drinking apple cider vinegar  4. Call PCP to schedule medication check

## 2020-08-12 ENCOUNTER — VIRTUAL VISIT (OUTPATIENT)
Dept: PSYCHOLOGY | Facility: CLINIC | Age: 40
End: 2020-08-12
Payer: COMMERCIAL

## 2020-08-12 DIAGNOSIS — F41.1 GENERALIZED ANXIETY DISORDER: Primary | ICD-10-CM

## 2020-08-12 PROCEDURE — 90837 PSYTX W PT 60 MINUTES: CPT | Mod: 95 | Performed by: MARRIAGE & FAMILY THERAPIST
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
## 2020-08-20 ENCOUNTER — MYC MEDICAL ADVICE (OUTPATIENT)
Dept: FAMILY MEDICINE | Facility: OTHER | Age: 40
End: 2020-08-20

## 2020-08-24 ENCOUNTER — VIRTUAL VISIT (OUTPATIENT)
Dept: FAMILY MEDICINE | Facility: OTHER | Age: 40
End: 2020-08-24
Payer: COMMERCIAL

## 2020-08-24 DIAGNOSIS — N91.5 LIGHT MENSTRUAL FLOW: Primary | ICD-10-CM

## 2020-08-24 PROCEDURE — 99207 ZZC NO BILLABLE SERVICE THIS VISIT: CPT | Mod: TEL | Performed by: PHYSICIAN ASSISTANT

## 2020-08-24 NOTE — PROGRESS NOTES
"Cornelia Mauricio is a 39 year old female who is being evaluated via a billable telephone visit.      The patient has been notified of following:     \"This telephone visit will be conducted via a call between you and your physician/provider. We have found that certain health care needs can be provided without the need for a physical exam.  This service lets us provide the care you need with a short phone conversation.  If a prescription is necessary we can send it directly to your pharmacy.  If lab work is needed we can place an order for that and you can then stop by our lab to have the test done at a later time.    Telephone visits are billed at different rates depending on your insurance coverage. During this emergency period, for some insurers they may be billed the same as an in-person visit.  Please reach out to your insurance provider with any questions.    If during the course of the call the physician/provider feels a telephone visit is not appropriate, you will not be charged for this service.\"    Patient has given verbal consent for Telephone visit?  Yes    What phone number would you like to be contacted at? [phone]    How would you like to obtain your AVS? Maria Esther Marti     Cornelia Mauricio is a 39 year old female who presents via phone visit today for the following health issues:    HPI    Concern - Questions about fertility   Description: Pt would like to check her fertility. Pt and partner do not want children, SO had a consult for vasectomy but was told it would need to be a bigger procedure than planned.  They do not want to proceed with vasectomy.  Pt is wondering if it's even a worry if she isn't fertile.  She reports that her periods are very regular and on time they are just very light.  She still gets PMS every month as well.  She does not tolerate birth control or any form of contraception per patient.  She states they do not have intercourse often and currently have just been " "\"careful.\"             Review of Systems   As documented above        Objective          Vitals:  No vitals were obtained today due to virtual visit.    healthy, alert and no distress  PSYCH: Alert and oriented times 3; coherent speech, normal   rate and volume, able to articulate logical thoughts, able   to abstract reason, no tangential thoughts, no hallucinations   or delusions  Her affect is normal and pleasant  RESP: No cough, no audible wheezing, able to talk in full sentences  Remainder of exam unable to be completed due to telephone visits    No results found for this or any previous visit (from the past 24 hour(s)).        Assessment/Plan:    Assessment & Plan   This visit was completed virtually due to our current COVID 19 pandemic.  The patient will be seen as soon as possible in the office.  If there is any significant change in or worsening of symptoms patient will call in to be triaged, present to the emergency department, or call 911 if acute worsening is noted.   Light menstrual flow  I discussed with patient the fact that her periods are regular to me indicates that she would be fertile.  Nonetheless, patient is interested in doing some hormone testing.  I will order this for her and let her know.  In the meantime she must use condoms to prevent pregnancy  - Lutropin; Future  - Follicle stimulating hormone; Future           Return in about 1 week (around 8/31/2020) for Lab Work.    Lyly Zayas PA-C  [facility]    Phone call duration:  4.5 minutes              "

## 2020-08-25 DIAGNOSIS — N91.5 LIGHT MENSTRUAL FLOW: ICD-10-CM

## 2020-08-25 LAB
FSH SERPL-ACNC: 7.1 IU/L
LH SERPL-ACNC: 5 IU/L

## 2020-08-25 PROCEDURE — 83002 ASSAY OF GONADOTROPIN (LH): CPT | Performed by: PHYSICIAN ASSISTANT

## 2020-08-25 PROCEDURE — 36415 COLL VENOUS BLD VENIPUNCTURE: CPT | Performed by: PHYSICIAN ASSISTANT

## 2020-08-25 PROCEDURE — 83001 ASSAY OF GONADOTROPIN (FSH): CPT | Performed by: PHYSICIAN ASSISTANT

## 2020-09-16 ENCOUNTER — VIRTUAL VISIT (OUTPATIENT)
Dept: PSYCHOLOGY | Facility: CLINIC | Age: 40
End: 2020-09-16
Payer: COMMERCIAL

## 2020-09-16 DIAGNOSIS — F41.1 GENERALIZED ANXIETY DISORDER: Primary | ICD-10-CM

## 2020-09-16 DIAGNOSIS — F33.1 MODERATE RECURRENT MAJOR DEPRESSION (H): ICD-10-CM

## 2020-09-16 PROCEDURE — 90834 PSYTX W PT 45 MINUTES: CPT | Mod: 95 | Performed by: MARRIAGE & FAMILY THERAPIST

## 2020-09-16 PROCEDURE — 99207 ZZC CDG-CODE INCORRECT PER BILLING BASED ON TIME: CPT | Performed by: MARRIAGE & FAMILY THERAPIST

## 2020-09-16 NOTE — PATIENT INSTRUCTIONS
Patient states that her goals for the next week include:    1. Continue to use inversion chair at least 1x/week  2. Work on relationship with teen daughter  3. Restart drinking apple cider vinegar water    Patient's homework: spend at least two hours of quality time with daughter during next two weeks.
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
## 2020-10-14 ENCOUNTER — VIRTUAL VISIT (OUTPATIENT)
Dept: PSYCHOLOGY | Facility: CLINIC | Age: 40
End: 2020-10-14
Payer: COMMERCIAL

## 2020-10-14 DIAGNOSIS — F41.1 GENERALIZED ANXIETY DISORDER: Primary | ICD-10-CM

## 2020-10-14 DIAGNOSIS — F33.1 MODERATE RECURRENT MAJOR DEPRESSION (H): ICD-10-CM

## 2020-10-14 PROCEDURE — 90837 PSYTX W PT 60 MINUTES: CPT | Mod: 95 | Performed by: MARRIAGE & FAMILY THERAPIST

## 2020-10-14 NOTE — PATIENT INSTRUCTIONS
Patient states that her goals for the next month include:    1. Improve self-care (drink ACV water and use inversion table)  2. Tell boyfriend about therapist option at [facility] (Curtis Cm, [facility])
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
## 2020-11-04 ENCOUNTER — TELEPHONE (OUTPATIENT)
Dept: FAMILY MEDICINE | Facility: OTHER | Age: 40
End: 2020-11-04

## 2020-11-04 NOTE — TELEPHONE ENCOUNTER
Reason for Call:  Medication or medication refill:    Do you use a [facility]?  Name of the pharmacy and phone number for the current request:  [facility] - [phone]    Name of the medication requested: buspirone    Other request: patient states this is working very well and is wondering if she could increase the dose    Can we leave a detailed message on this number? YES    Phone number patient can be reached at: Home number on file [phone] (home)    Best Time: any    Call taken on 11/4/2020 at 2:04 PM by Nikki Moore

## 2020-11-05 NOTE — TELEPHONE ENCOUNTER
Spoke with patient appointment scheduled      11/12/20 will not be out of medication before appt  Closing encounter  Kaur Singer RT (R)

## 2020-11-05 NOTE — TELEPHONE ENCOUNTER
Yes we can discuss increasing her dosage but she would need virtual visit for this.  I am out of the office until Monday, if she will run out of meds before our visit, I can refill for her tomorrow  Lyly Zayas PA-C

## 2020-11-10 NOTE — PROGRESS NOTES
"Cornelia Mauricio is a 39 year old female who is being evaluated via a billable video visit.  Answers for HPI/ROS submitted by the patient on 11/12/2020   Chronic problems general questions HPI Form  If you checked off any problems, how difficult have these problems made it for you to do your work, take care of things at home, or get along with other people?: Very difficult  PHQ9 TOTAL SCORE: 10  TYSHAWN 7 TOTAL SCORE: 8      The patient has been notified of following:     \"This video visit will be conducted via a call between you and your physician/provider. We have found that certain health care needs can be provided without the need for an in-person physical exam.  This service lets us provide the care you need with a video conversation.  If a prescription is necessary we can send it directly to your pharmacy.  If lab work is needed we can place an order for that and you can then stop by our lab to have the test done at a later time.    Video visits are billed at different rates depending on your insurance coverage.  Please reach out to your insurance provider with any questions.    If during the course of the call the physician/provider feels a video visit is not appropriate, you will not be charged for this service.\"    Patient has given verbal consent for Video visit? Yes  How would you like to obtain your AVS? MyChart  If you are dropped from the video visit, the video invite should be resent to: Text to cell phone: [phone]  Will anyone else be joining your video visit? No      Subjective     Cornelia Mauricio is a 39 year old female who presents today via video visit for the following health issues:    History of Present Illness       Mental Health Follow-up:  Patient presents to follow-up on Depression & Anxiety.Patient's depression since last visit has been:  Medium  The patient is not having other symptoms associated with depression.  Patient's anxiety since last visit has been:  Medium  The patient is not " "having other symptoms associated with anxiety.  Any significant life events: No  Patient is not feeling anxious or having panic attacks.  Patient has no concerns about alcohol or drug use.     Social History  Tobacco Use    Smoking status: Never Smoker    Smokeless tobacco: Never Used  Alcohol use: Yes    Comment: 1-2 days per month has 1-2 beers  Drug use: No      Today's PHQ-9         PHQ-9 Total Score:     (P) 10   PHQ-9 Q9 Thoughts of better off dead/self-harm past 2 weeks :   (P) Several days   Thoughts of suicide or self harm:  (P) Yes   Self-harm Plan:    (P) No   Self-harm Action:      (P) No   Safety concerns for self or others: (P) No         She eats 2-3 servings of fruits and vegetables daily.She consumes 0 sweetened beverage(s) daily.She exercises with enough effort to increase her heart rate 10 to 19 minutes per day.  She exercises with enough effort to increase her heart rate 3 or less days per week. She is missing 3 dose(s) of medications per week.  She is not taking prescribed medications regularly due to other.    Depression and Anxiety Follow-Up    How are you doing with your depression since your last visit? Great when she uses it, has a hard time remembering to take it.  She has been using it more as needed as opposed to scheduled.  She feels that the dosage could be higher and it seems to wear off more quickly than she would like.  She is having relationship issues with her significant other.  She has been seeing counseling.  She is going to contact her counselor and switch to a counselor that does relationship counseling as well.  She has been feeling a bit more depressed due to the issues with her significant other as well as dealing with her 16-year-old daughter.  She has had 2 episodes where she had a fleeting thought of \"just not being here.\"  She would never do anything to harm herself she has no plans or intention of harming herself    How are you doing with your anxiety since your last " visit?  Great when using it    Are you having other symptoms that might be associated with depression or anxiety? No    Have you had a significant life event? No     Do you have any concerns with your use of alcohol or other drugs? No    Social History     Tobacco Use     Smoking status: Never Smoker     Smokeless tobacco: Never Used   Substance Use Topics     Alcohol use: Yes     Comment: 1-2 days per month has 1-2 beers     Drug use: No     PHQ 5/5/2020 6/18/2020 11/12/2020   PHQ-9 Total Score 5 5 10   Q9: Thoughts of better off dead/self-harm past 2 weeks Not at all Not at all Several days   F/U: Thoughts of suicide or self-harm - - Yes   F/U: Self harm-plan - - No   F/U: Self-harm action - - No   F/U: Safety concerns - - No     TYSHAWN-7 SCORE 5/5/2020 6/18/2020 11/12/2020   Total Score - - -   Total Score - - 8 (mild anxiety)   Total Score 7 6 8     Last PHQ-9 11/12/2020   1.  Little interest or pleasure in doing things 1   2.  Feeling down, depressed, or hopeless 1   3.  Trouble falling or staying asleep, or sleeping too much 0   4.  Feeling tired or having little energy 3   5.  Poor appetite or overeating 2   6.  Feeling bad about yourself 1   7.  Trouble concentrating 1   8.  Moving slowly or restless 0   Q9: Thoughts of better off dead/self-harm past 2 weeks 1   PHQ-9 Total Score 10   Difficulty at work, home, or with people -   In the past two weeks have you had thoughts of suicide or self harm? Yes   Do you have concerns about your personal safety or the safety of others? No   In the past 2 weeks have you thought about a plan or had intention to harm yourself? No   In the past 2 weeks have you acted on these thoughts in any way? No     TYSHAWN-7  11/12/2020   1. Feeling nervous, anxious, or on edge 1   2. Not being able to stop or control worrying 1   3. Worrying too much about different things 1   4. Trouble relaxing 1   5. Being so restless that it is hard to sit still 2   6. Becoming easily annoyed or  irritable 2   7. Feeling afraid, as if something awful might happen 0   TYSHAWN-7 Total Score 8   If you checked any problems, how difficult have they made it for you to do your work, take care of things at home, or get along with other people? -       Suicide Assessment Five-step Evaluation and Treatment (SAFE-T)         Video Start Time: 11:18 AM        Review of Systems   See PHQ 9 and TYSHAWN 7 questionnaires for symptoms.       Objective           Vitals:  No vitals were obtained today due to virtual visit.    Physical Exam     GENERAL: Healthy, alert and no distress  EYES: Eyes grossly normal to inspection.  No discharge or erythema, or obvious scleral/conjunctival abnormalities.  RESP: No audible wheeze, cough, or visible cyanosis.  No visible retractions or increased work of breathing.    SKIN: Visible skin clear. No significant rash, abnormal pigmentation or lesions.  NEURO: Cranial nerves grossly intact.  Mentation and speech appropriate for age.  PSYCH: Mentation appears normal, affect normal/bright, judgement and insight intact, normal speech and appearance well-groomed.      Orders Only on 08/25/2020   Component Date Value Ref Range Status     FSH 08/25/2020 7.1  IU/L Final    Comment: FSH Reference Range  Female: Follicular      2.5-10.2          Mid-cycle       3.4-33.4          Luteal          1.5-9.1          Postmenopausal  23.0-116.3       Lutropin 08/25/2020 5.0  IU/L Final    Comment: LH Reference Range  Female: Follicular      1.9-12.5          Mid-cycle       8.7-76.3          Luteal          0.5-16.9          Postmenopausal  15.9-54.0               Assessment & Plan     Adjustment disorder with mixed anxiety and depressed mood--  Increased dosage, has done well at 5 mg but feels like a higher dosage would be better for her  - busPIRone (BUSPAR) 10 MG tablet; Take 1 tablet (10 mg) by mouth 3 times daily    recheck in 6 months sooner prn  Depression Screening Follow Up    PHQ 11/12/2020   PHQ-9 Total  Score 10   Q9: Thoughts of better off dead/self-harm past 2 weeks Several days   F/U: Thoughts of suicide or self-harm Yes   F/U: Self harm-plan No   F/U: Self-harm action No   F/U: Safety concerns No     Last PHQ-9 11/12/2020   1.  Little interest or pleasure in doing things 1   2.  Feeling down, depressed, or hopeless 1   3.  Trouble falling or staying asleep, or sleeping too much 0   4.  Feeling tired or having little energy 3   5.  Poor appetite or overeating 2   6.  Feeling bad about yourself 1   7.  Trouble concentrating 1   8.  Moving slowly or restless 0   Q9: Thoughts of better off dead/self-harm past 2 weeks 1   PHQ-9 Total Score 10   Difficulty at work, home, or with people -   In the past two weeks have you had thoughts of suicide or self harm? Yes   Do you have concerns about your personal safety or the safety of others? No   In the past 2 weeks have you thought about a plan or had intention to harm yourself? No   In the past 2 weeks have you acted on these thoughts in any way? No            Follow Up      Follow Up Actions Taken  Referred patient back to mental health provider,     Discussed the following ways the patient can remain in a safe environment:  be around others          Return in about 6 months (around 5/12/2021) for depression/anxiety.    Lyly Zayas PA-C  [facility]    Electronically signed by Lyly Zayas PA-C   Video-Visit Details    Type of service:  Video Visit    Video End Time:11:34 AM    Originating Location (pt. Location): Home    Distant Location (provider location):  [facility]     Platform used for Video Visit: Nas

## 2020-11-12 ENCOUNTER — VIRTUAL VISIT (OUTPATIENT)
Dept: FAMILY MEDICINE | Facility: OTHER | Age: 40
End: 2020-11-12
Payer: COMMERCIAL

## 2020-11-12 DIAGNOSIS — F43.23 ADJUSTMENT DISORDER WITH MIXED ANXIETY AND DEPRESSED MOOD: Primary | ICD-10-CM

## 2020-11-12 PROCEDURE — 99213 OFFICE O/P EST LOW 20 MIN: CPT | Mod: 95 | Performed by: PHYSICIAN ASSISTANT

## 2020-11-12 RX ORDER — BUSPIRONE HYDROCHLORIDE 10 MG/1
10 TABLET ORAL 3 TIMES DAILY
Qty: 90 TABLET | Refills: 5 | Status: SHIPPED | OUTPATIENT
Start: 2020-11-12 | End: 2022-08-10

## 2020-11-12 ASSESSMENT — ANXIETY QUESTIONNAIRES
1. FEELING NERVOUS, ANXIOUS, OR ON EDGE: SEVERAL DAYS
4. TROUBLE RELAXING: SEVERAL DAYS
GAD7 TOTAL SCORE: 8
2. NOT BEING ABLE TO STOP OR CONTROL WORRYING: SEVERAL DAYS
GAD7 TOTAL SCORE: 8
5. BEING SO RESTLESS THAT IT IS HARD TO SIT STILL: MORE THAN HALF THE DAYS
7. FEELING AFRAID AS IF SOMETHING AWFUL MIGHT HAPPEN: NOT AT ALL
3. WORRYING TOO MUCH ABOUT DIFFERENT THINGS: SEVERAL DAYS
7. FEELING AFRAID AS IF SOMETHING AWFUL MIGHT HAPPEN: NOT AT ALL
6. BECOMING EASILY ANNOYED OR IRRITABLE: MORE THAN HALF THE DAYS
GAD7 TOTAL SCORE: 8

## 2020-11-12 ASSESSMENT — PATIENT HEALTH QUESTIONNAIRE - PHQ9
SUM OF ALL RESPONSES TO PHQ QUESTIONS 1-9: 10
10. IF YOU CHECKED OFF ANY PROBLEMS, HOW DIFFICULT HAVE THESE PROBLEMS MADE IT FOR YOU TO DO YOUR WORK, TAKE CARE OF THINGS AT HOME, OR GET ALONG WITH OTHER PEOPLE: VERY DIFFICULT
SUM OF ALL RESPONSES TO PHQ QUESTIONS 1-9: 10

## 2020-11-13 ASSESSMENT — ANXIETY QUESTIONNAIRES: GAD7 TOTAL SCORE: 8

## 2020-11-13 ASSESSMENT — PATIENT HEALTH QUESTIONNAIRE - PHQ9: SUM OF ALL RESPONSES TO PHQ QUESTIONS 1-9: 10

## 2020-11-29 ENCOUNTER — HEALTH MAINTENANCE LETTER (OUTPATIENT)
Age: 40
End: 2020-11-29

## 2020-12-08 ENCOUNTER — MYC MEDICAL ADVICE (OUTPATIENT)
Dept: FAMILY MEDICINE | Facility: OTHER | Age: 40
End: 2020-12-08

## 2020-12-08 DIAGNOSIS — Z30.41 ENCOUNTER FOR SURVEILLANCE OF CONTRACEPTIVE PILLS: ICD-10-CM

## 2020-12-08 RX ORDER — DESOGESTREL AND ETHINYL ESTRADIOL 0.15-0.03
1 KIT ORAL DAILY
Qty: 84 TABLET | Refills: 0 | Status: SHIPPED | OUTPATIENT
Start: 2020-12-08 | End: 2022-01-04

## 2021-02-09 DIAGNOSIS — B37.31 YEAST INFECTION OF THE VAGINA: ICD-10-CM

## 2021-02-09 NOTE — TELEPHONE ENCOUNTER
Pending Prescriptions:                       Disp   Refills    fluconazole (DIFLUCAN) 150 MG tablet       2 tabl*0        Sig: Take 1 tablet (150 mg) by mouth once for 1 dose May           repeat after antibiotic is gone if needed    Routing refill request to provider for review/approval because:  Drug not active on patient's medication list

## 2021-02-10 RX ORDER — FLUCONAZOLE 150 MG/1
150 TABLET ORAL ONCE
Qty: 2 TABLET | Refills: 0 | Status: SHIPPED | OUTPATIENT
Start: 2021-02-10 | End: 2021-02-10

## 2021-04-01 ENCOUNTER — VIRTUAL VISIT (OUTPATIENT)
Dept: FAMILY MEDICINE | Facility: OTHER | Age: 41
End: 2021-04-01
Payer: COMMERCIAL

## 2021-04-01 ENCOUNTER — TELEPHONE (OUTPATIENT)
Dept: FAMILY MEDICINE | Facility: OTHER | Age: 41
End: 2021-04-01

## 2021-04-01 DIAGNOSIS — M62.838 CERVICAL PARASPINOUS MUSCLE SPASM: Primary | ICD-10-CM

## 2021-04-01 PROCEDURE — 99213 OFFICE O/P EST LOW 20 MIN: CPT | Mod: 95 | Performed by: PHYSICIAN ASSISTANT

## 2021-04-01 RX ORDER — METHYLPREDNISOLONE 4 MG
TABLET, DOSE PACK ORAL
Qty: 21 TABLET | Refills: 0 | Status: SHIPPED | OUTPATIENT
Start: 2021-04-01 | End: 2022-01-04

## 2021-04-01 RX ORDER — CYCLOBENZAPRINE HCL 10 MG
10 TABLET ORAL 3 TIMES DAILY PRN
Qty: 45 TABLET | Refills: 1 | Status: SHIPPED | OUTPATIENT
Start: 2021-04-01 | End: 2022-01-04

## 2021-04-01 NOTE — PROGRESS NOTES
Cornelia is a 40 year old who is being evaluated via a billable video visit.      How would you like to obtain your AVS? MyChart  If the video visit is dropped, the invitation should be resent by: Text to cell phone: [phone]  Will anyone else be joining your video visit? No      Video Start Time: 10:39 AM    Assessment & Plan     Cervical paraspinous muscle spasm  Should apply ice and/or heat to help minimize spasm, she may use ibuprofen over-the-counter any topical muscle rubs though she should avoid aggressive massage as that may trigger spasming as well.  She may use cyclobenzaprine as needed and she is requesting Medrol Dosepak as that has worked in the past.  She also is not going to work today or tomorrow and plans to go back to work on Monday, she should be able to work on Monday if not we can do another visit to recheck  - cyclobenzaprine (FLEXERIL) 10 MG tablet; Take 1 tablet (10 mg) by mouth 3 times daily as needed for muscle spasms  - methylPREDNISolone (MEDROL DOSEPAK) 4 MG tablet therapy pack; Follow Package Directions                 Return in about 1 week (around 4/8/2021), or if symptoms worsen or fail to improve.    Lyly Zayas PA-C  [facility]   Cornelia is a 40 year old who presents for the following health issues     HPI     Concern - still neck   Onset: 4 days ago, started with just a tightness and an ache.  This morning at 1:30 a.m. she woke up with significant left side neck spasming anytime she moved.  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  It is a lot of looking down.  She works 10-hour days during the workweek and 6-hour days on the weekend.  She has done this different position for a week and a half.  Description: left side of neck and is swollen and cramping up  Intensity: moderate  Progression of Symptoms:  worsening  Accompanying Signs & Symptoms: dull ache when she rests and sharp pain if she moves around or turns  her head, it is very tender to palpation and the muscle is very tight and taut  Previous history of similar problem: no  Precipitating factors:        Worsened by: movement, better with laying still however it still may spasm even at rest  Alleviating factors:        Improved by: resting and muscle relaxant, she has cyclobenzaprine from 10 years ago which do help to some extent  Therapies tried and outcome: increased fluids and potassium, muscle relaxants, and massage and gives some relief         Review of Systems   As above she has no other complaints or concerns today she denies any headache or distal paresthesias      Objective           Vitals:  No vitals were obtained today due to virtual visit.    Physical Exam   GENERAL: Healthy, alert and no distress  EYES: Eyes grossly normal to inspection.  No discharge or erythema, or obvious scleral/conjunctival abnormalities.  RESP: No audible wheeze, cough, or visible cyanosis.  No visible retractions or increased work of breathing.    MS: She is lying on her back with a pillow under her neck wincing in pain if she moves her head from side to side  SKIN: Visible skin clear. No significant rash, abnormal pigmentation or lesions.  NEURO: Cranial nerves grossly intact.  Mentation and speech appropriate for age.  PSYCH: Mentation appears normal, affect normal/bright, judgement and insight intact, normal speech and appearance well-groomed.                Video-Visit Details    Type of service:  Video Visit    Video End Time:10:46 AM    Originating Location (pt. Location): Home    Distant Location (provider location):  [facility]     Platform used for Video Visit: [employer]

## 2021-04-01 NOTE — TELEPHONE ENCOUNTER
Patient calling and requesting a video appointment with provider. Rescent change with job and she states she is spending more time looking down at her job which is causing stiffness and muscle spasms to neck. Scheduled for video appointment today at 10:15 am. Bev Dorsey LPN

## 2021-04-12 ENCOUNTER — VIRTUAL VISIT (OUTPATIENT)
Dept: FAMILY MEDICINE | Facility: OTHER | Age: 41
End: 2021-04-12
Payer: COMMERCIAL

## 2021-04-12 DIAGNOSIS — M62.838 CERVICAL PARASPINOUS MUSCLE SPASM: Primary | ICD-10-CM

## 2021-04-12 DIAGNOSIS — F33.1 MODERATE RECURRENT MAJOR DEPRESSION (H): ICD-10-CM

## 2021-04-12 PROCEDURE — 99213 OFFICE O/P EST LOW 20 MIN: CPT | Mod: 95 | Performed by: PHYSICIAN ASSISTANT

## 2021-04-12 NOTE — PROGRESS NOTES
Cornelia is a 40 year old who is being evaluated via a billable video visit.      How would you like to obtain your AVS? MyChart  If the video visit is dropped, the invitation should be resent by: Text to cell phone: [phone]  Will anyone else be joining your video visit? No      Video Start Time: 2:07 PM    Assessment & Plan     Cervical paraspinous muscle spasm  She can do physical therapy for free per patient at work she will get this started and she would like to see sports medicine, it sounds as if she is describing a potential trigger point at the base of her skull on the left please assess to see if trigger point injection will be of benefit  - Orthopedic & Spine  Referral; Future    Moderate recurrent major depression (H)  Patient is doing very well at this time denies any depressive symptoms.                 Return in about 1 week (around 4/19/2021), or if symptoms worsen or fail to improve.    Lyly Zayas PA-C  [facility]   Cornelia is a 40 year old who presents for the following health issues     HPI     Neck Pain  Onset/Duration: ongoing for about 1.5 weeks this is her second virtual visit, initially she had intense spasms of pain with even the most small movement.  With use of the prednisone, this remarkably improved she did very well last week up until about 5 days ago when she started to notice some mild tightness at the base of her skull on the left only that has increased.  Is not causing any paresthesias down the left arm denies any headaches.  She has a specific spot that causes a lot of discomfort when pressing on it.  She has been treating it with Advil and Flexeril but it just seems to be tightening up no matter what she does.  Description:   Location: base of neck by her upper back  Radiation: left shoulder blade and up into left side of neck  Intensity: 8/10  Progression of Symptoms:  worsening  Accompanying Signs & Symptoms:  Burning,  tingling, prickly sensation in arm(s): no  Numbness in arm(s): no  Weakness in arm(s):  no  Fever: no  Headache: YES- occasionally  Nausea and/or vomiting: no  History:   Trauma: no  Previous neck pain: YES, see previous virtual visit  Previous surgery or injections: no  Previous Imaging (MRI,X ray): no  Precipitating or alleviating factors: None  Does movement impact the pain:  YES  Therapies tried and outcome: heat, ice, tylenol/ibuprofen    She reports they have access to physical therapy for free at her work.  She would be interested in starting this.    Review of Systems   As documented above       Objective           Vitals:  No vitals were obtained today due to virtual visit.    Physical Exam   GENERAL: Healthy, alert and no distress  EYES: Eyes grossly normal to inspection.  No discharge or erythema, or obvious scleral/conjunctival abnormalities.  RESP: No audible wheeze, cough, or visible cyanosis.  No visible retractions or increased work of breathing.    MS: Indicates area of pain to be the base of the skull on the left rating down the trapezius musculature, does not wince in pain with every little movement as she did at her last visit, improved range of motion of her neck  SKIN: Visible skin clear. No significant rash, abnormal pigmentation or lesions.  NEURO: Cranial nerves grossly intact.  Mentation and speech appropriate for age.  PSYCH: Mentation appears normal, affect normal/bright, judgement and insight intact, normal speech and appearance well-groomed.                Video-Visit Details    Type of service:  Video Visit    Video End Time:2:15 PM    Originating Location (pt. Location): Home    Distant Location (provider location):  [facility]     Platform used for Video Visit: [employer]

## 2021-05-21 ENCOUNTER — FCC EXTENDED DOCUMENTATION (OUTPATIENT)
Dept: PSYCHOLOGY | Facility: CLINIC | Age: 41
End: 2021-05-21
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
## 2021-07-22 ENCOUNTER — VIRTUAL VISIT (OUTPATIENT)
Dept: PSYCHOLOGY | Facility: CLINIC | Age: 41
End: 2021-07-22
Payer: COMMERCIAL

## 2021-07-22 DIAGNOSIS — F41.1 GENERALIZED ANXIETY DISORDER: Primary | ICD-10-CM

## 2021-07-22 PROCEDURE — 90834 PSYTX W PT 45 MINUTES: CPT | Mod: 95 | Performed by: MARRIAGE & FAMILY THERAPIST
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
## 2021-07-22 NOTE — PATIENT INSTRUCTIONS
Patient states that her goals for the next two weeks include:    1. Schedule a session of family counseling for pt and daughter  2. Continue looking into housing options

## 2021-08-05 ENCOUNTER — VIRTUAL VISIT (OUTPATIENT)
Dept: PSYCHOLOGY | Facility: CLINIC | Age: 41
End: 2021-08-05
Payer: COMMERCIAL

## 2021-08-05 DIAGNOSIS — F33.1 MODERATE RECURRENT MAJOR DEPRESSION (H): ICD-10-CM

## 2021-08-05 DIAGNOSIS — F41.1 GENERALIZED ANXIETY DISORDER: Primary | ICD-10-CM

## 2021-08-05 PROCEDURE — 90834 PSYTX W PT 45 MINUTES: CPT | Mod: 95 | Performed by: MARRIAGE & FAMILY THERAPIST
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
## 2021-08-05 NOTE — PATIENT INSTRUCTIONS
"Patient states that her goals for the next two weeks include:    1. Continue to prepare house for sale  2. Set boundary with daughter: \"If you didn't buy it, it's not yours [except for gifts]. Talk to me before selling/donating items.\"  3. Talk to daughter's potential new landlord to get physical address    "

## 2021-08-19 ENCOUNTER — VIRTUAL VISIT (OUTPATIENT)
Dept: PSYCHOLOGY | Facility: CLINIC | Age: 41
End: 2021-08-19
Payer: COMMERCIAL

## 2021-08-19 DIAGNOSIS — F33.1 MODERATE RECURRENT MAJOR DEPRESSION (H): ICD-10-CM

## 2021-08-19 DIAGNOSIS — F41.1 GENERALIZED ANXIETY DISORDER: Primary | ICD-10-CM

## 2021-08-19 PROCEDURE — 90834 PSYTX W PT 45 MINUTES: CPT | Mod: 95 | Performed by: MARRIAGE & FAMILY THERAPIST
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
## 2021-08-19 NOTE — PATIENT INSTRUCTIONS
"Patient states that her goals for the next two weeks include:    1. \"Pack up my house\"  2. Prepare for garage sale  "

## 2021-09-15 ENCOUNTER — VIRTUAL VISIT (OUTPATIENT)
Dept: PSYCHOLOGY | Facility: CLINIC | Age: 41
End: 2021-09-15
Payer: COMMERCIAL

## 2021-09-15 DIAGNOSIS — F41.1 GENERALIZED ANXIETY DISORDER: Primary | ICD-10-CM

## 2021-09-15 PROCEDURE — 90834 PSYTX W PT 45 MINUTES: CPT | Mod: 95 | Performed by: MARRIAGE & FAMILY THERAPIST
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
## 2021-09-15 NOTE — PATIENT INSTRUCTIONS
Patient states that her goals for the next month include:    1. Prepare new home for dad's arrival on 9/25  2. Prepare for housewarming party on 10/9  3. Purchase window coverings, kitchen cabinets, and new countertops

## 2021-09-19 ENCOUNTER — HEALTH MAINTENANCE LETTER (OUTPATIENT)
Age: 41
End: 2021-09-19

## 2021-10-13 ENCOUNTER — VIRTUAL VISIT (OUTPATIENT)
Dept: PSYCHOLOGY | Facility: CLINIC | Age: 41
End: 2021-10-13
Payer: COMMERCIAL

## 2021-10-13 DIAGNOSIS — F41.1 GENERALIZED ANXIETY DISORDER: Primary | ICD-10-CM

## 2021-10-13 PROCEDURE — 90834 PSYTX W PT 45 MINUTES: CPT | Mod: 95 | Performed by: MARRIAGE & FAMILY THERAPIST

## 2021-10-13 NOTE — PATIENT INSTRUCTIONS
"Patient states that her goals for the next month include:    1. Clean out the garage and get mom's car in there  2. Buy a stove  3. Install curtains and buy a storm door  4. Continue to appreciate the achieved goals and current time to self  5. \"Not be so mean\" to partner  "
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
## 2021-12-17 ENCOUNTER — VIRTUAL VISIT (OUTPATIENT)
Dept: PSYCHOLOGY | Facility: CLINIC | Age: 41
End: 2021-12-17
Payer: COMMERCIAL

## 2021-12-17 DIAGNOSIS — F33.1 MODERATE RECURRENT MAJOR DEPRESSION (H): ICD-10-CM

## 2021-12-17 DIAGNOSIS — F41.1 GENERALIZED ANXIETY DISORDER: Primary | ICD-10-CM

## 2021-12-17 PROCEDURE — 90834 PSYTX W PT 45 MINUTES: CPT | Mod: 95 | Performed by: MARRIAGE & FAMILY THERAPIST
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
## 2022-01-04 ENCOUNTER — VIRTUAL VISIT (OUTPATIENT)
Dept: FAMILY MEDICINE | Facility: CLINIC | Age: 42
End: 2022-01-04
Payer: COMMERCIAL

## 2022-01-04 DIAGNOSIS — Z11.52 ENCOUNTER FOR SCREENING LABORATORY TESTING FOR COVID-19 VIRUS: Primary | ICD-10-CM

## 2022-01-04 DIAGNOSIS — J06.9 VIRAL URI: ICD-10-CM

## 2022-01-04 PROCEDURE — 99213 OFFICE O/P EST LOW 20 MIN: CPT | Mod: 95 | Performed by: PHYSICIAN ASSISTANT

## 2022-01-04 NOTE — PATIENT INSTRUCTIONS
Your symptoms are concerning for COVID-19, Influenza, or other viral illness.  A COVID-19 and flu test have been ordered for you.  You will be contacted within 24 hours to schedule your test.  After completing the test, results should be available within 1-2 days and will be sent to your MyChart.  You may use Tylenol for fever and pain management and Robitussin-DM/Mucinex DM/Dayquil or Nyquil for cough and congestion.  Make sure to get plenty of rest and stay hydrated.      If you have severe symptoms such as chest pain, wheezing, shortness of breath, or fevers that you cannot control, please go to the emergency department.  It is important that you follow self-isolation guidelines as listed below.    Please reach out with any questions or concerns.  Take care,  Haley Menard PA-C    Patient Education     Viral Upper Respiratory Illness (Adult)  You have a viral upper respiratory illness (URI), which is another term for the common cold. This illness is contagious during the first few days. It is spread through the air by coughing and sneezing. It may also be spread by direct contact (touching the sick person and then touching your own eyes, nose, or mouth). Frequent handwashing will decrease risk of spread. Most viral illnesses go away within 7 to 10 days with rest and simple home remedies. Sometimes the illness may last for several weeks. Antibiotics will not kill a virus, and they are generally not prescribed for this condition.  Home care    If symptoms are severe, rest at home for the first 2 to 3 days. When you resume activity, don't let yourself get too tired.    Don't smoke. If you need help stopping, talk with your healthcare provider.    Avoid being exposed to cigarette smoke (yours or others ).    You may use acetaminophen or ibuprofen to control pain and fever, unless another medicine was prescribed. If you have chronic liver or kidney disease, have ever had a stomach ulcer or gastrointestinal bleeding, or  are taking blood-thinning medicines, talk with your healthcare provider before using these medicines. Aspirin should never be given to anyone under 18 years of age who is ill with a viral infection or fever. It may cause severe liver or brain damage.    Your appetite may be poor, so a light diet is fine. Stay well hydrated by drinking 6 to 8 glasses of fluids per day (water, soft drinks, juices, tea, or soup). Extra fluids will help loosen secretions in the nose and lungs.    Over-the-counter cold medicines will not shorten the length of time you re sick, but they may be helpful for the following symptoms: cough, sore throat, and nasal and sinus congestion. If you take prescription medicines, ask your healthcare provider or pharmacist which over-the-counter medicines are safe to use. (Note: Don't use decongestants if you have high blood pressure.)  Follow-up care  Follow up with your healthcare provider, or as advised.  When to seek medical advice  Call your healthcare provider right away if any of these occur:    Cough with lots of colored sputum (mucus)    Severe headache; face, neck, or ear pain    Difficulty swallowing due to throat pain    Fever of 100.4 F (38 C) or higher, or as directed by your healthcare provider  Call 911  Call 911 if any of these occur:    Chest pain, shortness of breath, wheezing, or difficulty breathing    Coughing up blood    Very severe pain with swallowing, especially if it goes along with a muffled voice   [employer] last reviewed this educational content on 6/1/2018 2000-2021 [employer]. All rights reserved. This information is not intended as a substitute for professional medical care. Always follow your healthcare professional's instructions.           Incase your test is positive for Covid 19.      Please stay home and away from others, even if you do not have symptoms.   Symptoms may appear up to 14 days after you are close to someone with COVID-19  (exposed).  You can spread COVID-19 to others a couple days before you have any symptoms, or even if you never have any symptoms of COVID-19.  If you have been fully vaccinated and test positive for COVID-19, you still need to stay home and away from others.   Tell your close contacts, including people who are fully vaccinated, that they may have been exposed to COVID-19 and share the information on Close Contacts and Quarantine.    During this time please make sure that you are drinking plenty of fluids.  Keeping your self hydrated can help to prevent other complications from COVID-19.  It is important that you be as active as you can while continuing to quarantine.  Please be intentional with your activity inside of your home.  More trips to the bathroom, up the stairs or around your living space.  Make sure you are getting plenty of rest.  It is important that you try to take deep breaths every hour while you are awake, even if it makes you cough.  This helps to prevent pneumonia.  You may take over-the-counter Tylenol/acetaminophen every 6-8 hours as needed for fever or body aches.  You should not take more than 3000 total milligrams of Tylenol/acetaminophen per day.    Stay home until all three of these things are true:  You feel better. Your cough, shortness of breath, or other symptoms are better.  AND  It has been 10 days since you first felt sick.  AND  You have had no fever for at least 24 hours, without using medicine that lowers fevers.    If you have tested positive for COVID-19 but do not have symptoms, you must still stay home and away from others for 10 days.    Monoclonal antibody treatment can be used as a treatment for COVID 19. Eligibility to receive monoclonal antibody infusion is determined by the [place] Department of [facility]. To be eligible you must test positive for COVID-19, be within 10 days of the start of your symptoms, be 12 years of age and older, weigh at least 88 pounds or 40 kg,  have a high risk for severe Covid and not be hospitalized.  Those considered high risk are:    Aged 65 years or older  Obesity (BMI of 35 kg/m2 or greater)  Diabetes  Chronic kidney disease  Cardiovascular disease in patients aged 55 years or older  Chronic lung disease in patients aged 55 years or older  Hypertension in patients aged 55 years or older  Immunocompromised  Pregnancy  Member of [facility] community (Black/, /, ,  or , or  or [place] Native)    If you are interested in learning more or signing up for the monoclonal antibody treatment I suggest that you visit the following website:  [email or web address]    If you have not received the COVID-19 vaccine or you are due for a booster you may receive it as long as you have recovered from the illness and you have finished your quarantine.  If you received the monoclonal antibody infusion you should wait 90 days before getting the vaccine or booster.    When to seek emergency medical attention:  Look for emergency warning signs for COVID-19. If you are showing any of these signs, seek emergency medical care immediately:    Trouble breathing  Persistent pain or pressure in the chest  Worsening cough  Wheezing  Extreme fatigue and/or weakness and you are unable to care for yourself  New confusion  Inability to wake or stay awake  Pale, gray, or blue-colored skin, lips, or nail beds, depending on skin tone  *This list is not all possible symptoms.     Please call or send a My Chart message for any other symptoms that are severe or concerning to you.    Please call or email with any additional questions or concerns

## 2022-01-04 NOTE — PROGRESS NOTES
Cornelia is a 41 year old who is being evaluated via a billable telephone visit.      What phone number would you like to be contacted at? [phone]  How would you like to obtain your AVS? [facility]    Assessment & Plan     Encounter for screening laboratory testing for COVID-19 virus  Viral URI  Patient is a 41 YOF who presents to telephone visit due to symptoms including cough, rhinorrhea, intermittent headache, and body aches as well as severe fatigue starting approximately 4-5 days ago.  Patient notes many family members have similar symptoms and one lost taste/smell.  Patient is not vaccinated against COVID-19.  No signs of respiratory distress.  Symptoms are most likely due to viral URI including possibly COVID-19 or influenza.  COVID-19 PCR and influenza antigen orders placed.  Discussed symptom management with rest, hydration, DayQuil/NyQuil.  Discussed symptoms that warrant urgent/emergent follow-up as well as return precautions.  - Symptomatic; Yes; 12/31/2021 COVID-19 Virus (Coronavirus) by PCR Nose; Future  - Influenza A/B antigen       See Patient Instructions    Return in about 1 week (around 1/11/2022), or if symptoms worsen or fail to improve.    TARYN Martinez [facility] WILFREDO Locke is a 41 year old who presents for the following health issues     HPI     Acute Illness  Acute illness concerns: Symptoms started Friday with cough, rhinorrhea, intermittent headache, and body aches. Patient notes severe exhaustion. Family members came to [place] with symptoms.   Patient is not vaccinated against COVID-19.  Onset/Duration: Sx began 12/31  Symptoms:  Fever: no  Chills/Sweats: YES- Warm flashes  Headache (location?): YES  Sinus Pressure: no  Conjunctivitis:  no  Ear Pain: no  Rhinorrhea: YES  Congestion: no  Sore Throat: no  Cough: YES-productive of yellow sputum  Wheeze: no  Decreased Appetite: YES  Nausea: YES  Vomiting: no  Diarrhea: no  Dysuria/Freq.: no  Dysuria or  Hematuria: no  Fatigue/Achiness: YES  Sick/Strep Exposure: YES- Family members with similar sx  Therapies tried and outcome: Dayquil, Nyquil        Objective           Vitals:  No vitals were obtained today due to virtual visit.    Physical Exam   healthy, alert and no distress  PSYCH: Alert and oriented times 3; coherent speech, normal   rate and volume, able to articulate logical thoughts, able   to abstract reason, no tangential thoughts, no hallucinations   or delusions  Her affect is normal  RESP: No cough, no audible wheezing, able to talk in full sentences  Remainder of exam unable to be completed due to telephone visits          Phone call duration: 8 minutes

## 2022-01-05 ENCOUNTER — MYC MEDICAL ADVICE (OUTPATIENT)
Dept: FAMILY MEDICINE | Facility: CLINIC | Age: 42
End: 2022-01-05
Payer: COMMERCIAL

## 2022-01-09 ENCOUNTER — HEALTH MAINTENANCE LETTER (OUTPATIENT)
Age: 42
End: 2022-01-09

## 2022-01-19 ENCOUNTER — VIRTUAL VISIT (OUTPATIENT)
Dept: PSYCHOLOGY | Facility: CLINIC | Age: 42
End: 2022-01-19
Payer: COMMERCIAL

## 2022-01-19 DIAGNOSIS — F41.1 GENERALIZED ANXIETY DISORDER: Primary | ICD-10-CM

## 2022-01-19 DIAGNOSIS — F33.1 MODERATE RECURRENT MAJOR DEPRESSION (H): ICD-10-CM

## 2022-01-19 PROCEDURE — 90834 PSYTX W PT 45 MINUTES: CPT | Mod: 95 | Performed by: MARRIAGE & FAMILY THERAPIST

## 2022-01-19 ASSESSMENT — PATIENT HEALTH QUESTIONNAIRE - PHQ9
10. IF YOU CHECKED OFF ANY PROBLEMS, HOW DIFFICULT HAVE THESE PROBLEMS MADE IT FOR YOU TO DO YOUR WORK, TAKE CARE OF THINGS AT HOME, OR GET ALONG WITH OTHER PEOPLE: SOMEWHAT DIFFICULT
SUM OF ALL RESPONSES TO PHQ QUESTIONS 1-9: 11
SUM OF ALL RESPONSES TO PHQ QUESTIONS 1-9: 11

## 2022-01-19 ASSESSMENT — ANXIETY QUESTIONNAIRES
GAD7 TOTAL SCORE: 9
3. WORRYING TOO MUCH ABOUT DIFFERENT THINGS: MORE THAN HALF THE DAYS
1. FEELING NERVOUS, ANXIOUS, OR ON EDGE: MORE THAN HALF THE DAYS
2. NOT BEING ABLE TO STOP OR CONTROL WORRYING: NOT AT ALL
7. FEELING AFRAID AS IF SOMETHING AWFUL MIGHT HAPPEN: NOT AT ALL
4. TROUBLE RELAXING: MORE THAN HALF THE DAYS
6. BECOMING EASILY ANNOYED OR IRRITABLE: NEARLY EVERY DAY
GAD7 TOTAL SCORE: 9
7. FEELING AFRAID AS IF SOMETHING AWFUL MIGHT HAPPEN: NOT AT ALL
5. BEING SO RESTLESS THAT IT IS HARD TO SIT STILL: NOT AT ALL
GAD7 TOTAL SCORE: 9
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
## 2022-01-19 NOTE — PATIENT INSTRUCTIONS
Patient states that her goals for the next month include:    1. Get back to using inversion table (if neck is okay)  2. Consider going for a walk for at least 10 minutes if weather is above 20 degrees F    Homework: focus on doing something for self-care that is not house project related

## 2022-01-20 ASSESSMENT — ANXIETY QUESTIONNAIRES: GAD7 TOTAL SCORE: 9

## 2022-01-20 ASSESSMENT — PATIENT HEALTH QUESTIONNAIRE - PHQ9: SUM OF ALL RESPONSES TO PHQ QUESTIONS 1-9: 11

## 2022-03-02 ENCOUNTER — VIRTUAL VISIT (OUTPATIENT)
Dept: PSYCHOLOGY | Facility: CLINIC | Age: 42
End: 2022-03-02
Payer: COMMERCIAL

## 2022-03-02 DIAGNOSIS — F41.1 GENERALIZED ANXIETY DISORDER: Primary | ICD-10-CM

## 2022-03-02 DIAGNOSIS — F33.1 MODERATE RECURRENT MAJOR DEPRESSION (H): ICD-10-CM

## 2022-03-02 PROCEDURE — 90834 PSYTX W PT 45 MINUTES: CPT | Mod: 95 | Performed by: MARRIAGE & FAMILY THERAPIST

## 2022-03-02 ASSESSMENT — PATIENT HEALTH QUESTIONNAIRE - PHQ9
SUM OF ALL RESPONSES TO PHQ QUESTIONS 1-9: 4
10. IF YOU CHECKED OFF ANY PROBLEMS, HOW DIFFICULT HAVE THESE PROBLEMS MADE IT FOR YOU TO DO YOUR WORK, TAKE CARE OF THINGS AT HOME, OR GET ALONG WITH OTHER PEOPLE: NOT DIFFICULT AT ALL
SUM OF ALL RESPONSES TO PHQ QUESTIONS 1-9: 4
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
## 2022-03-02 NOTE — PATIENT INSTRUCTIONS
Patient states that her goals for the next month include:    1. Move sister in this weekend  2. Transfer items in garage to sister's house  3. Complete some small house projects  4. Order storm door for front door of house  5. Plan trip to [place], [place], and [place] in April  6. Print out pictures for daughter's grad party  7. Get a haircut

## 2022-03-03 ASSESSMENT — PATIENT HEALTH QUESTIONNAIRE - PHQ9: SUM OF ALL RESPONSES TO PHQ QUESTIONS 1-9: 4

## 2022-04-05 ASSESSMENT — PATIENT HEALTH QUESTIONNAIRE - PHQ9
SUM OF ALL RESPONSES TO PHQ QUESTIONS 1-9: 2
10. IF YOU CHECKED OFF ANY PROBLEMS, HOW DIFFICULT HAVE THESE PROBLEMS MADE IT FOR YOU TO DO YOUR WORK, TAKE CARE OF THINGS AT HOME, OR GET ALONG WITH OTHER PEOPLE: SOMEWHAT DIFFICULT
SUM OF ALL RESPONSES TO PHQ QUESTIONS 1-9: 2

## 2022-04-06 ENCOUNTER — VIRTUAL VISIT (OUTPATIENT)
Dept: PSYCHOLOGY | Facility: CLINIC | Age: 42
End: 2022-04-06
Payer: COMMERCIAL

## 2022-04-06 DIAGNOSIS — F41.1 GENERALIZED ANXIETY DISORDER: Primary | ICD-10-CM

## 2022-04-06 PROCEDURE — 90834 PSYTX W PT 45 MINUTES: CPT | Mod: 95 | Performed by: MARRIAGE & FAMILY THERAPIST

## 2022-04-06 ASSESSMENT — PATIENT HEALTH QUESTIONNAIRE - PHQ9: SUM OF ALL RESPONSES TO PHQ QUESTIONS 1-9: 2
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
## 2022-04-06 NOTE — PATIENT INSTRUCTIONS
"Patient states that her goals for the next month include:    1. Research options for a   2. Go for at least one walk per week for \"me-time\"  3. Take Sherif the cat to the neurologist next Thursday  4. Continue to plan trip w/Mom  "
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
## 2022-08-10 ENCOUNTER — TELEPHONE (OUTPATIENT)
Dept: FAMILY MEDICINE | Facility: CLINIC | Age: 42
End: 2022-08-10

## 2022-08-10 ENCOUNTER — ANCILLARY PROCEDURE (OUTPATIENT)
Dept: GENERAL RADIOLOGY | Facility: CLINIC | Age: 42
End: 2022-08-10
Attending: PREVENTIVE MEDICINE
Payer: COMMERCIAL

## 2022-08-10 ENCOUNTER — OFFICE VISIT (OUTPATIENT)
Dept: FAMILY MEDICINE | Facility: CLINIC | Age: 42
End: 2022-08-10
Payer: COMMERCIAL

## 2022-08-10 VITALS
OXYGEN SATURATION: 97 % | HEART RATE: 82 BPM | BODY MASS INDEX: 31.4 KG/M2 | SYSTOLIC BLOOD PRESSURE: 111 MMHG | TEMPERATURE: 99.1 F | WEIGHT: 177.2 LBS | HEIGHT: 63 IN | DIASTOLIC BLOOD PRESSURE: 76 MMHG
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
## 2022-08-10 DIAGNOSIS — F43.23 ADJUSTMENT DISORDER WITH MIXED ANXIETY AND DEPRESSED MOOD: Primary | ICD-10-CM
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
## 2022-08-10 DIAGNOSIS — S40.012A CONTUSION OF LEFT SHOULDER, INITIAL ENCOUNTER: ICD-10-CM

## 2022-08-10 DIAGNOSIS — S83.92XA SPRAIN OF LEFT KNEE, UNSPECIFIED LIGAMENT, INITIAL ENCOUNTER: ICD-10-CM

## 2022-08-10 PROBLEM — F33.1 MODERATE RECURRENT MAJOR DEPRESSION (H): Status: RESOLVED | Noted: 2020-01-22 | Resolved: 2022-08-10

## 2022-08-10 PROCEDURE — 73562 X-RAY EXAM OF KNEE 3: CPT | Mod: TC | Performed by: RADIOLOGY

## 2022-08-10 PROCEDURE — 99214 OFFICE O/P EST MOD 30 MIN: CPT | Performed by: PREVENTIVE MEDICINE

## 2022-08-10 RX ORDER — IBUPROFEN 400 MG/1
400 TABLET, FILM COATED ORAL EVERY 8 HOURS PRN
Qty: 30 TABLET | Refills: 0 | Status: SHIPPED | OUTPATIENT
Start: 2022-08-10 | End: 2023-06-15

## 2022-08-10 RX ORDER — BUSPIRONE HYDROCHLORIDE 10 MG/1
10 TABLET ORAL 3 TIMES DAILY
Qty: 90 TABLET | Refills: 5 | Status: SHIPPED | OUTPATIENT
Start: 2022-08-10

## 2022-08-10 RX ORDER — BUSPIRONE HYDROCHLORIDE 10 MG/1
10 TABLET ORAL 3 TIMES DAILY
Qty: 90 TABLET | Refills: 5 | Status: CANCELLED | OUTPATIENT
Start: 2022-08-10

## 2022-08-10 ASSESSMENT — PAIN SCALES - GENERAL: PAINLEVEL: EXTREME PAIN (9)

## 2022-08-10 ASSESSMENT — ANXIETY QUESTIONNAIRES
GAD7 TOTAL SCORE: 3
5. BEING SO RESTLESS THAT IT IS HARD TO SIT STILL: SEVERAL DAYS
3. WORRYING TOO MUCH ABOUT DIFFERENT THINGS: NOT AT ALL
6. BECOMING EASILY ANNOYED OR IRRITABLE: SEVERAL DAYS
1. FEELING NERVOUS, ANXIOUS, OR ON EDGE: SEVERAL DAYS
IF YOU CHECKED OFF ANY PROBLEMS ON THIS QUESTIONNAIRE, HOW DIFFICULT HAVE THESE PROBLEMS MADE IT FOR YOU TO DO YOUR WORK, TAKE CARE OF THINGS AT HOME, OR GET ALONG WITH OTHER PEOPLE: NOT DIFFICULT AT ALL
GAD7 TOTAL SCORE: 3
7. FEELING AFRAID AS IF SOMETHING AWFUL MIGHT HAPPEN: NOT AT ALL
2. NOT BEING ABLE TO STOP OR CONTROL WORRYING: NOT AT ALL

## 2022-08-10 ASSESSMENT — PATIENT HEALTH QUESTIONNAIRE - PHQ9
5. POOR APPETITE OR OVEREATING: NOT AT ALL
SUM OF ALL RESPONSES TO PHQ QUESTIONS 1-9: 2

## 2022-08-10 NOTE — RESULT ENCOUNTER NOTE
Cornelia, your test results were within normal limits.  X rays of the knee are not showing any fractures, fluid collection or arthritis.   Plan of care and follow up as discussed in clinic.     Please do not hesitate to call us at [phone] if you have any questions or concerns.    Thank you,    Daya Fleming MD MPH
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
## 2022-08-12 ENCOUNTER — THERAPY VISIT (OUTPATIENT)
Dept: PHYSICAL THERAPY | Facility: CLINIC | Age: 42
End: 2022-08-12
Attending: PREVENTIVE MEDICINE
Payer: COMMERCIAL

## 2022-08-12 DIAGNOSIS — M25.552 HIP PAIN, LEFT: ICD-10-CM

## 2022-08-12 DIAGNOSIS — M25.562 ACUTE PAIN OF LEFT KNEE: ICD-10-CM

## 2022-08-12 DIAGNOSIS — S83.92XA SPRAIN OF LEFT KNEE, UNSPECIFIED LIGAMENT, INITIAL ENCOUNTER: ICD-10-CM
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
## 2022-08-12 PROCEDURE — 97110 THERAPEUTIC EXERCISES: CPT | Mod: GP | Performed by: PHYSICAL THERAPIST

## 2022-08-12 PROCEDURE — 97140 MANUAL THERAPY 1/> REGIONS: CPT | Mod: GP | Performed by: PHYSICAL THERAPIST

## 2022-08-12 ASSESSMENT — ACTIVITIES OF DAILY LIVING (ADL)
DEEP_SQUATTING: MODERATE DIFFICULTY
TWISTING/PIVOTING_ON_INVOLVED_LEG: SLIGHT DIFFICULTY
HOS_ADL_COUNT: 13
KNEE_ACTIVITY_OF_DAILY_LIVING_SUM: 44
SITTING_FOR_15_MINUTES: EXTREME DIFFICULTY
WALK: ACTIVITY IS SOMEWHAT DIFFICULT
PAIN: THE SYMPTOM AFFECTS MY ACTIVITY MODERATELY
KNEE_ACTIVITY_OF_DAILY_LIVING_SCORE: 62.86
WALKING_INITIALLY: SLIGHT DIFFICULTY
HOS_ADL_ITEM_SCORE_TOTAL: 32
GOING_DOWN_1_FLIGHT_OF_STAIRS: SLIGHT DIFFICULTY
SQUAT: ACTIVITY IS SOMEWHAT DIFFICULT
STAND: ACTIVITY IS MINIMALLY DIFFICULT
RAW_SCORE: 44
GOING_UP_1_FLIGHT_OF_STAIRS: MODERATE DIFFICULTY
WALKING_15_MINUTES_OR_GREATER: SLIGHT DIFFICULTY
HOS_ADL_HIGHEST_POTENTIAL_SCORE: 52
ROLLING_OVER_IN_BED: SLIGHT DIFFICULTY
HOW_WOULD_YOU_RATE_THE_OVERALL_FUNCTION_OF_YOUR_KNEE_DURING_YOUR_USUAL_DAILY_ACTIVITIES?: ABNORMAL
KNEEL ON THE FRONT OF YOUR KNEE: ACTIVITY IS SOMEWHAT DIFFICULT
WEAKNESS: THE SYMPTOM AFFECTS MY ACTIVITY SLIGHTLY
HOW_WOULD_YOU_RATE_YOUR_CURRENT_LEVEL_OF_FUNCTION_DURING_YOUR_USUAL_ACTIVITIES_OF_DAILY_LIVING_FROM_0_TO_100_WITH_100_BEING_YOUR_LEVEL_OF_FUNCTION_PRIOR_TO_YOUR_HIP_PROBLEM_AND_0_BEING_THE_INABILITY_TO_PERFORM_ANY_OF_YOUR_USUAL_DAILY_ACTIVITIES?: 40
HOW_WOULD_YOU_RATE_THE_CURRENT_FUNCTION_OF_YOUR_KNEE_DURING_YOUR_USUAL_DAILY_ACTIVITIES_ON_A_SCALE_FROM_0_TO_100_WITH_100_BEING_YOUR_LEVEL_OF_KNEE_FUNCTION_PRIOR_TO_YOUR_INJURY_AND_0_BEING_THE_INABILITY_TO_PERFORM_ANY_OF_YOUR_USUAL_DAILY_ACTIVITIES?: 40
GETTING_INTO_AND_OUT_OF_AN_AVERAGE_CAR: MODERATE DIFFICULTY
AS_A_RESULT_OF_YOUR_KNEE_INJURY,_HOW_WOULD_YOU_RATE_YOUR_CURRENT_LEVEL_OF_DAILY_ACTIVITY?: ABNORMAL
STANDING_FOR_15_MINUTES: MODERATE DIFFICULTY
HEAVY_WORK: EXTREME DIFFICULTY
STEPPING_UP_AND_DOWN_CURBS: MODERATE DIFFICULTY
STIFFNESS: THE SYMPTOM AFFECTS MY ACTIVITY SLIGHTLY
LIMPING: THE SYMPTOM AFFECTS MY ACTIVITY SLIGHTLY
WALKING_APPROXIMATELY_10_MINUTES: SLIGHT DIFFICULTY
SIT WITH YOUR KNEE BENT: ACTIVITY IS MINIMALLY DIFFICULT
GIVING WAY, BUCKLING OR SHIFTING OF KNEE: I DO NOT HAVE THE SYMPTOM
GO UP STAIRS: ACTIVITY IS FAIRLY DIFFICULT
LIGHT_TO_MODERATE_WORK: SLIGHT DIFFICULTY
RISE FROM A CHAIR: ACTIVITY IS SOMEWHAT DIFFICULT
GO DOWN STAIRS: ACTIVITY IS SOMEWHAT DIFFICULT
PUTTING_ON_SOCKS_AND_SHOES: EXTREME DIFFICULTY
SWELLING: THE SYMPTOM AFFECTS MY ACTIVITY SLIGHTLY
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
## 2022-08-16 ENCOUNTER — THERAPY VISIT (OUTPATIENT)
Dept: PHYSICAL THERAPY | Facility: CLINIC | Age: 42
End: 2022-08-16
Payer: COMMERCIAL
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
## 2022-08-16 DIAGNOSIS — M25.562 ACUTE PAIN OF LEFT KNEE: Primary | ICD-10-CM

## 2022-08-16 DIAGNOSIS — M25.552 HIP PAIN, LEFT: ICD-10-CM

## 2022-08-16 PROCEDURE — 97110 THERAPEUTIC EXERCISES: CPT | Mod: GP | Performed by: PHYSICAL THERAPIST

## 2022-08-16 PROCEDURE — 97140 MANUAL THERAPY 1/> REGIONS: CPT | Mod: GP | Performed by: PHYSICAL THERAPIST

## 2022-08-30 ENCOUNTER — THERAPY VISIT (OUTPATIENT)
Dept: PHYSICAL THERAPY | Facility: CLINIC | Age: 42
End: 2022-08-30
Payer: COMMERCIAL

## 2022-08-30 DIAGNOSIS — M25.552 HIP PAIN, LEFT: ICD-10-CM

## 2022-08-30 DIAGNOSIS — M25.562 ACUTE PAIN OF LEFT KNEE: Primary | ICD-10-CM
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
## 2022-08-30 PROCEDURE — 97112 NEUROMUSCULAR REEDUCATION: CPT | Mod: GP | Performed by: PHYSICAL THERAPIST

## 2022-08-30 PROCEDURE — 97140 MANUAL THERAPY 1/> REGIONS: CPT | Mod: GP | Performed by: PHYSICAL THERAPIST

## 2022-08-30 PROCEDURE — 97110 THERAPEUTIC EXERCISES: CPT | Mod: GP | Performed by: PHYSICAL THERAPIST

## 2022-09-01 DIAGNOSIS — F43.23 ADJUSTMENT DISORDER WITH MIXED ANXIETY AND DEPRESSED MOOD: ICD-10-CM

## 2022-09-02 RX ORDER — BUSPIRONE HYDROCHLORIDE 10 MG/1
TABLET ORAL
Qty: 90 TABLET | Refills: 5 | OUTPATIENT
Start: 2022-09-02

## 2022-09-02 NOTE — TELEPHONE ENCOUNTER
Duplicate order prescription filled on 8/10/2022 with a 90 day supply and 5 refills.   Michelle Taylor RN, BSN   [facility]

## 2022-09-09 ENCOUNTER — TELEPHONE (OUTPATIENT)
Dept: FAMILY MEDICINE | Facility: CLINIC | Age: 42
End: 2022-09-09
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
## 2022-09-12 ENCOUNTER — FCC EXTENDED DOCUMENTATION (OUTPATIENT)
Dept: PSYCHOLOGY | Facility: CLINIC | Age: 42
End: 2022-09-12
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
## 2022-09-15 NOTE — TELEPHONE ENCOUNTER
Patient calling on the status of the form. I do not see that we received the form. The message was routed to a folder we do not use. Tried reaching Belkis from [facility] through Teams to refax. She said that they do not have the form and that they gave the patient a copy of the form. Spoke to patient and she is going to try to attach to a My Chart message. If that does not work then the patient will go back to the [facility] and they will re fax to [phone]. Routing message to AN Farnsworth MA  [facility]  2nd Floor  Primary Care

## 2022-09-16 NOTE — TELEPHONE ENCOUNTER
Forms uploaded into pt's chart through [employer], forms printed and placed In provider's bin to address.

## 2022-09-22 NOTE — TELEPHONE ENCOUNTER
Received completed form faxed to Glenna Denise as requested at [phone] called patient to let her know it was completed right fax confirmed @ 8:30am    Copy placed in abstracting and TC file original mailed to patients home address verified with patient

## 2022-09-27 ENCOUNTER — VIRTUAL VISIT (OUTPATIENT)
Dept: PHYSICAL THERAPY | Facility: CLINIC | Age: 42
End: 2022-09-27
Payer: COMMERCIAL

## 2022-09-27 DIAGNOSIS — M25.562 ACUTE PAIN OF LEFT KNEE: Primary | ICD-10-CM
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
## 2022-09-27 DIAGNOSIS — M25.552 HIP PAIN, LEFT: ICD-10-CM

## 2022-09-27 PROCEDURE — 97110 THERAPEUTIC EXERCISES: CPT | Mod: GP | Performed by: PHYSICAL THERAPIST

## 2022-09-27 ASSESSMENT — ACTIVITIES OF DAILY LIVING (ADL)
LIMPING: THE SYMPTOM AFFECTS MY ACTIVITY SLIGHTLY
KNEEL ON THE FRONT OF YOUR KNEE: ACTIVITY IS SOMEWHAT DIFFICULT
GOING_DOWN_1_FLIGHT_OF_STAIRS: NO DIFFICULTY AT ALL
HOS_ADL_COUNT: 17
GETTING_INTO_AND_OUT_OF_AN_AVERAGE_CAR: NO DIFFICULTY AT ALL
HOW_WOULD_YOU_RATE_THE_CURRENT_FUNCTION_OF_YOUR_KNEE_DURING_YOUR_USUAL_DAILY_ACTIVITIES_ON_A_SCALE_FROM_0_TO_100_WITH_100_BEING_YOUR_LEVEL_OF_KNEE_FUNCTION_PRIOR_TO_YOUR_INJURY_AND_0_BEING_THE_INABILITY_TO_PERFORM_ANY_OF_YOUR_USUAL_DAILY_ACTIVITIES?: 90
HEAVY_WORK: NO DIFFICULTY AT ALL
RISE FROM A CHAIR: ACTIVITY IS MINIMALLY DIFFICULT
GIVING WAY, BUCKLING OR SHIFTING OF KNEE: I HAVE THE SYMPTOM BUT IT DOES NOT AFFECT MY ACTIVITY
TWISTING/PIVOTING_ON_INVOLVED_LEG: NO DIFFICULTY AT ALL
WALKING_DOWN_STEEP_HILLS: NO DIFFICULTY AT ALL
RECREATIONAL_ACTIVITIES: NO DIFFICULTY AT ALL
RAW_SCORE: 57
WALKING_INITIALLY: NO DIFFICULTY AT ALL
WEAKNESS: THE SYMPTOM AFFECTS MY ACTIVITY SLIGHTLY
GETTING_INTO_AND_OUT_OF_A_BATHTUB: NO DIFFICULTY AT ALL
GO UP STAIRS: ACTIVITY IS NOT DIFFICULT
WALKING_15_MINUTES_OR_GREATER: NO DIFFICULTY AT ALL
SWELLING: I DO NOT HAVE THE SYMPTOM
GO DOWN STAIRS: ACTIVITY IS MINIMALLY DIFFICULT
GOING_UP_1_FLIGHT_OF_STAIRS: NO DIFFICULTY AT ALL
SQUAT: ACTIVITY IS NOT DIFFICULT
SIT WITH YOUR KNEE BENT: ACTIVITY IS NOT DIFFICULT
ROLLING_OVER_IN_BED: NO DIFFICULTY AT ALL
HOS_ADL_SCORE(%): 100
PAIN: THE SYMPTOM AFFECTS MY ACTIVITY SLIGHTLY
WALKING_UP_STEEP_HILLS: NO DIFFICULTY AT ALL
DEEP_SQUATTING: NO DIFFICULTY AT ALL
WALK: ACTIVITY IS NOT DIFFICULT
HOW_WOULD_YOU_RATE_YOUR_CURRENT_LEVEL_OF_FUNCTION_DURING_YOUR_USUAL_ACTIVITIES_OF_DAILY_LIVING_FROM_0_TO_100_WITH_100_BEING_YOUR_LEVEL_OF_FUNCTION_PRIOR_TO_YOUR_HIP_PROBLEM_AND_0_BEING_THE_INABILITY_TO_PERFORM_ANY_OF_YOUR_USUAL_DAILY_ACTIVITIES?: 95
SITTING_FOR_15_MINUTES: NO DIFFICULTY AT ALL
PUTTING_ON_SOCKS_AND_SHOES: NO DIFFICULTY AT ALL
HOS_ADL_HIGHEST_POTENTIAL_SCORE: 68
HOS_ADL_ITEM_SCORE_TOTAL: 68
KNEE_ACTIVITY_OF_DAILY_LIVING_SCORE: 81.43
WALKING_APPROXIMATELY_10_MINUTES: NO DIFFICULTY AT ALL
AS_A_RESULT_OF_YOUR_KNEE_INJURY,_HOW_WOULD_YOU_RATE_YOUR_CURRENT_LEVEL_OF_DAILY_ACTIVITY?: NEARLY NORMAL
STANDING_FOR_15_MINUTES: NO DIFFICULTY AT ALL
HOW_WOULD_YOU_RATE_THE_OVERALL_FUNCTION_OF_YOUR_KNEE_DURING_YOUR_USUAL_DAILY_ACTIVITIES?: NEARLY NORMAL
LIGHT_TO_MODERATE_WORK: NO DIFFICULTY AT ALL
STIFFNESS: I HAVE THE SYMPTOM BUT IT DOES NOT AFFECT MY ACTIVITY
KNEE_ACTIVITY_OF_DAILY_LIVING_SUM: 57
STEPPING_UP_AND_DOWN_CURBS: NO DIFFICULTY AT ALL
STAND: ACTIVITY IS MINIMALLY DIFFICULT
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
## 2022-11-07 PROBLEM — M25.562 ACUTE PAIN OF LEFT KNEE: Status: RESOLVED | Noted: 2022-08-12 | Resolved: 2022-11-07

## 2022-11-07 PROBLEM — M25.552 HIP PAIN, LEFT: Status: RESOLVED | Noted: 2022-08-12 | Resolved: 2022-11-07

## 2022-11-07 NOTE — PROGRESS NOTES
Discharge Note    Progress reporting period is from initial evaluation date (please see noted date below) to Sep 27, 2022.  Linked Episodes   Type: Episode: Status: Noted: Resolved: Last update: Updated by:   PHYSICAL THERAPY L knee pain 8/12/22 Active 8/12/2022 9/27/2022  5:35 PM Maldonado Younger, PT      Comments:       Cornelia failed to follow up and current status is unknown.  Please see information below for last relevant information on current status.  Patient seen for 4 visits.    SUBJECTIVE  Subjective changes noted by patient:  she is doing 80-90% better, she was sore for 3 days after her last appt, she still has pain with knee and walking up/down hills, she will get occasional weakness in her L leg if she over does things and she gets sore, if she does too much walking on uneven surfaces, she will recover completely with sitting  .  Current pain level is 1/10.     Previous pain level was  9/10.   Changes in function:  Yes (See Goal flowsheet attached for changes in current functional level)  Adverse reaction to treatment or activity: None    OBJECTIVE  Changes noted in objective findings: mild tender L articularis genu, no tenderness L proximal rectus femoris, recirpocal pattern up and down stairs. Pt has hypertonicity of medial aspect of the articularis genu on the L. knee. Palpable when at 100 flexion in seated position. Some relief obtained by eccentric lifting and manual therapy.    ASSESSMENT/PLAN  Diagnosis: L knee pain   Updated problem list and treatment plan:   Pain - HEP  Decreased function - HEP  STG/LTGs have been met or progress has been made towards goals:  Yes, please see goal flowsheet for most current information  Assessment of Progress: current status is unknown.    Last current status: Pt is progressing as expected   Self Management Plans:  HEP  I have re-evaluated this patient and find that the nature, scope, duration and intensity of the therapy is appropriate for the medical condition  of the patient.  Cornelia continues to require the following intervention to meet STG and LTG's:  HEP.    Recommendations:  Discharge with current home program.  Patient to follow up with MD as needed.    Please refer to the daily flowsheet for treatment today, total treatment time and time spent performing 1:1 timed codes.    Maldonado Younger,PT, DPT, OCS

## 2022-11-14 ENCOUNTER — OFFICE VISIT (OUTPATIENT)
Dept: FAMILY MEDICINE | Facility: CLINIC | Age: 42
End: 2022-11-14
Payer: COMMERCIAL

## 2022-11-14 ENCOUNTER — ANCILLARY PROCEDURE (OUTPATIENT)
Dept: GENERAL RADIOLOGY | Facility: CLINIC | Age: 42
End: 2022-11-14
Attending: PHYSICIAN ASSISTANT
Payer: COMMERCIAL

## 2022-11-14 VITALS
BODY MASS INDEX: 31.01 KG/M2 | DIASTOLIC BLOOD PRESSURE: 85 MMHG | SYSTOLIC BLOOD PRESSURE: 133 MMHG | TEMPERATURE: 97.3 F | HEART RATE: 91 BPM | RESPIRATION RATE: 16 BRPM | OXYGEN SATURATION: 99 % | HEIGHT: 63 IN | WEIGHT: 175 LBS

## 2022-11-14 DIAGNOSIS — Z11.4 SCREENING FOR HIV (HUMAN IMMUNODEFICIENCY VIRUS): ICD-10-CM

## 2022-11-14 DIAGNOSIS — M62.838 CERVICAL PARASPINOUS MUSCLE SPASM: ICD-10-CM

## 2022-11-14 DIAGNOSIS — Z11.59 NEED FOR HEPATITIS C SCREENING TEST: ICD-10-CM

## 2022-11-14 DIAGNOSIS — M54.2 NECK PAIN: ICD-10-CM

## 2022-11-14 DIAGNOSIS — M54.2 NECK PAIN: Primary | ICD-10-CM

## 2022-11-14 PROCEDURE — 72040 X-RAY EXAM NECK SPINE 2-3 VW: CPT | Mod: TC | Performed by: RADIOLOGY

## 2022-11-14 PROCEDURE — 99214 OFFICE O/P EST MOD 30 MIN: CPT | Performed by: PHYSICIAN ASSISTANT

## 2022-11-14 RX ORDER — TRAMADOL HYDROCHLORIDE 50 MG/1
50 TABLET ORAL EVERY 6 HOURS PRN
Qty: 30 TABLET | Refills: 0 | Status: SHIPPED | OUTPATIENT
Start: 2022-11-14 | End: 2023-02-14

## 2022-11-14 RX ORDER — CYCLOBENZAPRINE HCL 10 MG
10 TABLET ORAL 2 TIMES DAILY PRN
Qty: 45 TABLET | Refills: 1 | Status: SHIPPED | OUTPATIENT
Start: 2022-11-14 | End: 2023-01-05

## 2022-11-14 ASSESSMENT — PAIN SCALES - GENERAL: PAINLEVEL: MILD PAIN (2)
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
## 2022-11-21 ENCOUNTER — HEALTH MAINTENANCE LETTER (OUTPATIENT)
Age: 42
End: 2022-11-21

## 2023-01-02 DIAGNOSIS — M62.838 CERVICAL PARASPINOUS MUSCLE SPASM: ICD-10-CM

## 2023-01-05 RX ORDER — CYCLOBENZAPRINE HCL 10 MG
10 TABLET ORAL
Qty: 90 TABLET | Refills: 1 | Status: SHIPPED | OUTPATIENT
Start: 2023-01-05

## 2023-01-05 NOTE — TELEPHONE ENCOUNTER
Pending Prescriptions:                       Disp   Refills    cyclobenzaprine (FLEXERIL) 10 MG tablet [P*45 tab*1        Sig: Take 1 tablet (10 mg) by mouth 2 times daily as           needed for muscle spasms        Routing refill request to provider for review/approval because:  Drug not on the [facility] refill protocol

## 2023-02-11 ENCOUNTER — MYC MEDICAL ADVICE (OUTPATIENT)
Dept: FAMILY MEDICINE | Facility: CLINIC | Age: 43
End: 2023-02-11
Payer: COMMERCIAL

## 2023-02-11 NOTE — TELEPHONE ENCOUNTER
Patient Quality Outreach    Patient is due for the following:   Depression  -  PHQ-9 needed  Physical Preventive Adult Physical      Topic Date Due     Hepatitis B Vaccine (1 of 3 - 3-dose series) Never done     COVID-19 Vaccine (1) Never done     Flu Vaccine (1) Never done     Chronic Opioid Use -  Treatment Agreement (CSA), Urine Drug Screen and PHQ-9    Next Steps:   Schedule a Adult Preventative    Type of outreach:    Sent VYou message.      Questions for provider review:    Patient is flagging for chronic pain: Please update problem list with F11.2 or F11.9 if appropriate. Will need CSA at next appt if appropriate as well.      Kiara Dorsey, [facility]  Chart routed to Care Team and provider.

## 2023-02-14 ENCOUNTER — MYC REFILL (OUTPATIENT)
Dept: FAMILY MEDICINE | Facility: CLINIC | Age: 43
End: 2023-02-14
Payer: COMMERCIAL

## 2023-02-14 DIAGNOSIS — M54.2 NECK PAIN: ICD-10-CM

## 2023-02-14 DIAGNOSIS — M62.838 CERVICAL PARASPINOUS MUSCLE SPASM: ICD-10-CM

## 2023-02-14 NOTE — TELEPHONE ENCOUNTER
Pending Prescriptions:                       Disp   Refills    traMADol (ULTRAM) 50 MG tablet             30 tab*0        Sig: Take 1 tablet (50 mg) by mouth every 6 hours as           needed for pain maximum 2 tablet(s) per day        Routing refill request to provider for review/approval because:  Drug not on the [facility] refill protocol

## 2023-02-15 RX ORDER — TRAMADOL HYDROCHLORIDE 50 MG/1
50 TABLET ORAL EVERY 6 HOURS PRN
Qty: 30 TABLET | Refills: 0 | Status: SHIPPED | OUTPATIENT
Start: 2023-02-15 | End: 2023-05-31

## 2023-02-15 ASSESSMENT — PATIENT HEALTH QUESTIONNAIRE - PHQ9
SUM OF ALL RESPONSES TO PHQ QUESTIONS 1-9: 4
SUM OF ALL RESPONSES TO PHQ QUESTIONS 1-9: 4
10. IF YOU CHECKED OFF ANY PROBLEMS, HOW DIFFICULT HAVE THESE PROBLEMS MADE IT FOR YOU TO DO YOUR WORK, TAKE CARE OF THINGS AT HOME, OR GET ALONG WITH OTHER PEOPLE: SOMEWHAT DIFFICULT

## 2023-02-15 ASSESSMENT — ANXIETY QUESTIONNAIRES
GAD7 TOTAL SCORE: 1
8. IF YOU CHECKED OFF ANY PROBLEMS, HOW DIFFICULT HAVE THESE MADE IT FOR YOU TO DO YOUR WORK, TAKE CARE OF THINGS AT HOME, OR GET ALONG WITH OTHER PEOPLE?: NOT DIFFICULT AT ALL
2. NOT BEING ABLE TO STOP OR CONTROL WORRYING: NOT AT ALL
7. FEELING AFRAID AS IF SOMETHING AWFUL MIGHT HAPPEN: NOT AT ALL
4. TROUBLE RELAXING: NOT AT ALL
5. BEING SO RESTLESS THAT IT IS HARD TO SIT STILL: NOT AT ALL
7. FEELING AFRAID AS IF SOMETHING AWFUL MIGHT HAPPEN: NOT AT ALL
1. FEELING NERVOUS, ANXIOUS, OR ON EDGE: NOT AT ALL
IF YOU CHECKED OFF ANY PROBLEMS ON THIS QUESTIONNAIRE, HOW DIFFICULT HAVE THESE PROBLEMS MADE IT FOR YOU TO DO YOUR WORK, TAKE CARE OF THINGS AT HOME, OR GET ALONG WITH OTHER PEOPLE: NOT DIFFICULT AT ALL
6. BECOMING EASILY ANNOYED OR IRRITABLE: SEVERAL DAYS
3. WORRYING TOO MUCH ABOUT DIFFERENT THINGS: NOT AT ALL

## 2023-02-16 NOTE — TELEPHONE ENCOUNTER
[facility] read and questionnaires completed; routing to provider.    Kiara Dosrey CMA ([facility])

## 2023-04-16 ENCOUNTER — HEALTH MAINTENANCE LETTER (OUTPATIENT)
Age: 43
End: 2023-04-16

## 2023-05-24 ENCOUNTER — OFFICE VISIT (OUTPATIENT)
Dept: FAMILY MEDICINE | Facility: CLINIC | Age: 43
End: 2023-05-24
Payer: COMMERCIAL

## 2023-05-24 VITALS
TEMPERATURE: 97.8 F | BODY MASS INDEX: 30.48 KG/M2 | WEIGHT: 172 LBS | RESPIRATION RATE: 16 BRPM | OXYGEN SATURATION: 98 % | SYSTOLIC BLOOD PRESSURE: 132 MMHG | DIASTOLIC BLOOD PRESSURE: 82 MMHG | HEIGHT: 63 IN | HEART RATE: 90 BPM

## 2023-05-24 DIAGNOSIS — I83.811 VARICOSE VEINS OF RIGHT LOWER EXTREMITY WITH PAIN: Primary | ICD-10-CM

## 2023-05-24 DIAGNOSIS — M54.2 NECK PAIN: ICD-10-CM

## 2023-05-24 DIAGNOSIS — M62.838 CERVICAL PARASPINOUS MUSCLE SPASM: ICD-10-CM

## 2023-05-24 PROCEDURE — 99214 OFFICE O/P EST MOD 30 MIN: CPT | Performed by: PHYSICIAN ASSISTANT

## 2023-05-24 ASSESSMENT — PAIN SCALES - GENERAL: PAINLEVEL: SEVERE PAIN (7)

## 2023-05-24 NOTE — PROGRESS NOTES
"  Assessment & Plan     Varicose veins of right lower extremity with pain  Advised support stockings, ordered ultrasounds as below and referred to vascular to consider her treatment options  - Vascular Surgery Referral; Future  - US Lower Extremity Venous Duplex Bilateral; Future  - US Venous Competency Bilateral; Future        Neck pain  To continue otc meds prn for pain and muscle relaxer as needed as well our next step in treatment for her is physical therapy  - Physical Therapy Referral; Future    Cervical paraspinous muscle spasm  She will schedule PT at work, her employer has a physical therapist onsite  - Physical Therapy Referral; Future       BMI:   Estimated body mass index is 30.23 kg/m  as calculated from the following:    Height as of this encounter: 1.607 m (5' 3.25\").    Weight as of this encounter: 78 kg (172 lb).   Weight management plan: Discussed healthy diet and exercise guidelines        Lyly Zayas PA-C  [facility] JULIANE Locke is a 42 year old, presenting for the following health issues:  Varicose Vein and Neck Pain        5/24/2023     2:49 PM   Additional Questions   Roomed by VE   Accompanied by SELF     History of Present Illness       Reason for visit:  Vericose vein discomfort and pains    She eats 0-1 servings of fruits and vegetables daily.She consumes 0 sweetened beverage(s) daily.She exercises with enough effort to increase her heart rate 10 to 19 minutes per day.  She exercises with enough effort to increase her heart rate 3 or less days per week.   She is taking medications regularly.       Would like referral for Varicose Veins.  She has had varicose veins since her pregnancy 20 years ago and have progressively gotten worse.  Her right leg is much worse than her left they continue to get more and more advanced.  Her legs always feel tired no matter how little activity she does.  She has not noted swelling in her right leg necessarily.  She was " "seen in the emergency room on May 6 due to leg pain.  She was concerned about DVT.  She had a negative ultrasound at that time for blood clot.      Pain History:  When did you first notice your pain? 1 weeks   Have you seen anyone else for your pain? No  How has your pain affected your ability to work? 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 Neck Pain, she has had chronic neck pain she uses her cyclobenzaprine as needed but continues to have pain.  She wonders what her next treatment option is  Onset/Duration: 1 weeks  Description:   Location: neck  Radiation: none, she does note some numbness and tingling in her left upper back between her shoulder blades  Intensity: 7/10  Progression of Symptoms:  same  Accompanying Signs & Symptoms:  Burning, tingling, prickly sensation in arm(s): YES between shoulder blades  Numbness in arm(s): No  Weakness in arm(s):  No  Fever: No  Headache: No  Nausea and/or vomiting: No  History:   Trauma: No  Previous neck pain: YES  Previous surgery or injections: No  Previous Imaging (MRI,X ray): YES,  DDD cervical spine  Precipitating or alleviating factors: None  Does movement impact the pain:  YES  Therapies tried and outcome: muscle relaxers            Review of Systems   As documented above       Objective    /82 (BP Location: Left arm, Patient Position: Chair, Cuff Size: Adult Regular)   Pulse 90   Temp 97.8  F (36.6  C) (Temporal)   Resp 16   Ht 1.607 m (5' 3.25\")   Wt 78 kg (172 lb)   LMP 05/01/2023 (Approximate)   SpO2 98%   Breastfeeding No   BMI 30.23 kg/m    Body mass index is 30.23 kg/m .  Physical Exam   GENERAL: healthy, alert and no distress  NECK: Full range of motion she is tender to palpation over the paraspinous muscles of the cervical and upper trapezius musculature  RESP: lungs clear to auscultation - no rales, rhonchi or wheezes  CV: regular rate and rhythm, normal S1 " S2, no S3 or S4, no murmur, click or rub, no peripheral edema and peripheral pulses strong  MS: varicose veins significant large over upper and lower right leg, very minimal spider varicosities of her I do not see any sores on either leg    Impression    IMPRESSION:     No DVT.       REPORT SIGNED BY DR. Anup Hensley  Narrative    EXAM: ULTRASOUND VENOUS -- LOWER EXTREMITY UNILATERAL     DATE: 5/6/2023 6:53 PM     COMPARISON: None and [facility]. None submitted.     CLINICAL DATA: Right lower extremity pain and swelling.     TECHNIQUE: Gray-scale and color flow doppler imaging, as well as spectral analysis, of the deep venous structures of the right thigh and upper calf.     FINDINGS:     Common Femoral Vein: No clot seen. Normal compression and augmentation.     Proximal Femoral Vein: No clot seen. Normal compression and augmentation.     Mid Femoral Vein: No clot seen. Normal compression and augmentation.     Distal Femoral Vein: No clot seen. Normal compression and augmentation.     Popliteal Vein: No clot seen. Normal compression and augmentation.     Peroneal Vein: No clot seen at the level of the upper calf.     Posterior Tibial Vein: No clot seen at the level of the upper calf.     Saphenofemoral Junction: No clot seen.  Exam End: 05/06/23  7:07 PM    Specimen Collected: 05/06/23  7:07 PM Last Resulted: 05/06/23  7:08 PM   Received From: [facility]  Result Received: 05/24/23  2:41 PM

## 2023-05-25 ENCOUNTER — TELEPHONE (OUTPATIENT)
Dept: VASCULAR SURGERY | Facility: CLINIC | Age: 43
End: 2023-05-25
Payer: COMMERCIAL

## 2023-05-31 ENCOUNTER — MYC REFILL (OUTPATIENT)
Dept: FAMILY MEDICINE | Facility: CLINIC | Age: 43
End: 2023-05-31
Payer: COMMERCIAL

## 2023-05-31 DIAGNOSIS — M54.2 NECK PAIN: ICD-10-CM

## 2023-05-31 DIAGNOSIS — M62.838 CERVICAL PARASPINOUS MUSCLE SPASM: ICD-10-CM

## 2023-05-31 NOTE — TELEPHONE ENCOUNTER
2nd attempt reaching out to pt. Left a message for pt to call back at her convenience to ask questions or set up an appointment when she's ready. Phone number [phone]

## 2023-06-01 RX ORDER — TRAMADOL HYDROCHLORIDE 50 MG/1
50 TABLET ORAL EVERY 6 HOURS PRN
Qty: 30 TABLET | Refills: 0 | Status: SHIPPED | OUTPATIENT
Start: 2023-06-01 | End: 2023-06-15

## 2023-06-15 ENCOUNTER — MYC REFILL (OUTPATIENT)
Dept: FAMILY MEDICINE | Facility: CLINIC | Age: 43
End: 2023-06-15
Payer: COMMERCIAL
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
## 2023-06-15 DIAGNOSIS — M62.838 CERVICAL PARASPINOUS MUSCLE SPASM: ICD-10-CM

## 2023-06-15 DIAGNOSIS — S83.92XA SPRAIN OF LEFT KNEE, UNSPECIFIED LIGAMENT, INITIAL ENCOUNTER: ICD-10-CM

## 2023-06-15 DIAGNOSIS — M54.2 NECK PAIN: ICD-10-CM

## 2023-06-15 NOTE — TELEPHONE ENCOUNTER
Pending Prescriptions:                       Disp   Refills    traMADol (ULTRAM) 50 MG tablet             30 tab*0        Sig: Take 1 tablet (50 mg) by mouth every 6 hours as           needed for pain maximum 2 tablet(s) per day    Routing refill request to provider for review/approval because:  Drug not on the [facility] [place] protocol     Requested Prescriptions   Pending Prescriptions Disp Refills    traMADol (ULTRAM) 50 MG tablet 30 tablet 0     Sig: Take 1 tablet (50 mg) by mouth every 6 hours as needed for pain maximum 2 tablet(s) per day       There is no refill protocol information for this order

## 2023-06-16 RX ORDER — IBUPROFEN 400 MG/1
400 TABLET, FILM COATED ORAL EVERY 8 HOURS PRN
Qty: 30 TABLET | Refills: 0 | Status: SHIPPED | OUTPATIENT
Start: 2023-06-16

## 2023-06-16 RX ORDER — TRAMADOL HYDROCHLORIDE 50 MG/1
50 TABLET ORAL EVERY 6 HOURS PRN
Qty: 30 TABLET | Refills: 0 | Status: SHIPPED | OUTPATIENT
Start: 2023-06-16

## 2023-06-19 ENCOUNTER — TELEPHONE (OUTPATIENT)
Dept: FAMILY MEDICINE | Facility: CLINIC | Age: 43
End: 2023-06-19
Payer: COMMERCIAL

## 2023-06-19 NOTE — TELEPHONE ENCOUNTER
Received not from pharmacy for an alternative for:     Tramadon HCL 50mg Tablets    Per pharmacy- medication is not covered.     Gray Manuel MA on 6/19/2023 at 5:21 PM

## 2023-06-20 ENCOUNTER — MYC MEDICAL ADVICE (OUTPATIENT)
Dept: FAMILY MEDICINE | Facility: CLINIC | Age: 43
End: 2023-06-20
Payer: COMMERCIAL

## 2023-06-20 ENCOUNTER — TELEPHONE (OUTPATIENT)
Dept: FAMILY MEDICINE | Facility: CLINIC | Age: 43
End: 2023-06-20
Payer: COMMERCIAL

## 2023-06-20 DIAGNOSIS — M62.838 CERVICAL PARASPINOUS MUSCLE SPASM: ICD-10-CM

## 2023-06-20 DIAGNOSIS — M54.2 NECK PAIN: ICD-10-CM

## 2023-06-20 RX ORDER — TRAMADOL HYDROCHLORIDE 50 MG/1
50 TABLET ORAL EVERY 6 HOURS PRN
Qty: 30 TABLET | Refills: 0 | Status: CANCELLED | OUTPATIENT
Start: 2023-06-20

## 2023-06-20 NOTE — TELEPHONE ENCOUNTER
Refill pending and pharmacy entered; routing to provider.    Kiara Dorsey CMA ([facility])

## 2023-06-20 NOTE — TELEPHONE ENCOUNTER
New Medication Request    Contacts       Type Contact Phone/Fax    06/20/2023 02:50 PM CDT Phone (Incoming) Robmelidasabrina Cornelia MARCIAL (Self) [phone] (M)          What medication are you requesting?: Tramadon HCL 50mg Tablets    Reason for medication request: Pharm. Told pt they had not heard from dr to refill rx.    Have you taken this medication before?: Yes:     Controlled Substance Agreement on file:   CSA -- Patient Level:    CSA: None found at the patient level.         Patient offered an appointment? No    Preferred Pharmacy:     [facility]/pharmacy #[id number]  [place], [place] - [address] [facility] AT [address] [address]  Phone: [phone] Fax: [phone]      Could we send this information to you in [facility] or would you prefer to receive a phone call?:   Patient would prefer a phone call   Okay to leave a detailed message?: Yes at Cell number on file:    Telephone Information:   Mobile [phone]

## 2023-06-20 NOTE — TELEPHONE ENCOUNTER
She will need ov to discuss alternatives, please let pharmacy know I will not be sending in an alternate  Lyly Zayas PA-C

## 2023-06-21 NOTE — TELEPHONE ENCOUNTER
Please call pt- I sent it in a few days ago  it was denied due to not being covered by insurance- see PA denial.  ADAN AgarwalC

## 2023-06-22 NOTE — TELEPHONE ENCOUNTER
See encounter 6/16/23.   PA was initiated on 6/21/23. Updated patient via my chart.     Janae CORNELLN, RN  [facility]

## 2023-07-11 ENCOUNTER — MYC MEDICAL ADVICE (OUTPATIENT)
Dept: FAMILY MEDICINE | Facility: CLINIC | Age: 43
End: 2023-07-11
Payer: COMMERCIAL

## 2023-07-11 NOTE — LETTER
July 19, 2023      Cornelia Mauricio  [id number] [place] [address]              Dear Cornelia,      We care about your health and have reviewed your health plan including your medical conditions, medications, and lab results.  Based on this review, it is recommended that you follow up regarding the following health topic(s):  -Depression  -Wellness (Physical) Visit      We recommend you take the following action(s):  -schedule a WELLNESS (Physical) APPOINTMENT.  We will perform the following labs: none at this time.  -Complete and return the attached PHQ-9 Form.  If your total score is greater than 9, please schedule a followup appointment.  If you answer Yes to question 9, call your clinic between the hours of 8 to 5.  You may also call the Suicide Hotline at 4-075-391-JRJD (3488) any time.     Please call us at the [facility] [place] [phone] (or use [employer]) to address the above recommendations.      Thank you for trusting [facility] and we appreciate the opportunity to serve you.  We look forward to supporting your healthcare needs in the future.     Healthy Regards,     Your Health Care Team at [facility]

## 2023-07-11 NOTE — TELEPHONE ENCOUNTER
Patient Quality Outreach    Patient is due for the following:   Depression  -  PHQ-9 needed  Physical Preventive Adult Physical      Topic Date Due     Hepatitis B Vaccine (1 of 3 - 3-dose series) Never done     COVID-19 Vaccine (1) Never done     Chronic Opioid Use -  Treatment Agreement (CSA) and Urine Drug Screen    Next Steps:   Schedule a Adult Preventative    Type of outreach:    Sent [employer] message.      Questions for provider review:     You are listed as patient's pain provider and patient has had 3 or more opioids in the last 12 months prescribed. Please review and add chronic diagnosis to problem list-either F11.2 or F11.9 so that the correct HEALTH MAINTENANCE items trigger for follow up if appropriate. Patient will need urine drug screen and CSA at next in clinic visit.   DO NOT CLOSE as we are still trying to reach patient.             Kiara Dorsey, [facility]  Chart routed to Care Team and provider.

## 2023-07-12 NOTE — TELEPHONE ENCOUNTER
Will review use and order csa/drug screen if needed at next appt, the plan is not to use routinely  Lyly Zayas PA-C

## 2024-02-04 ENCOUNTER — HEALTH MAINTENANCE LETTER (OUTPATIENT)
Age: 44
End: 2024-02-04

## 2024-06-23 ENCOUNTER — HEALTH MAINTENANCE LETTER (OUTPATIENT)
Age: 44
End: 2024-06-23

## 2025-01-28 ENCOUNTER — TRANSFERRED RECORDS (OUTPATIENT)
Dept: HEALTH INFORMATION MANAGEMENT | Facility: CLINIC | Age: 45
End: 2025-01-28
Payer: COMMERCIAL

## 2025-07-12 ENCOUNTER — HEALTH MAINTENANCE LETTER (OUTPATIENT)
Age: 45
End: 2025-07-12

## 2025-07-30 ENCOUNTER — OFFICE VISIT (OUTPATIENT)
Dept: FAMILY MEDICINE | Facility: CLINIC | Age: 45
End: 2025-07-30
Payer: COMMERCIAL

## 2025-07-30 VITALS
OXYGEN SATURATION: 99 % | HEART RATE: 82 BPM | SYSTOLIC BLOOD PRESSURE: 130 MMHG | TEMPERATURE: 97.6 F | WEIGHT: 185.31 LBS | DIASTOLIC BLOOD PRESSURE: 80 MMHG | HEIGHT: 63 IN | RESPIRATION RATE: 16 BRPM | BODY MASS INDEX: 32.84 KG/M2

## 2025-07-30 DIAGNOSIS — Z13.6 SCREENING FOR CARDIOVASCULAR CONDITION: ICD-10-CM

## 2025-07-30 DIAGNOSIS — F43.23 ADJUSTMENT DISORDER WITH MIXED ANXIETY AND DEPRESSED MOOD: Primary | ICD-10-CM

## 2025-07-30 DIAGNOSIS — Z13.1 SCREENING FOR DIABETES MELLITUS: ICD-10-CM

## 2025-07-30 DIAGNOSIS — E66.09 CLASS 1 OBESITY DUE TO EXCESS CALORIES WITH SERIOUS COMORBIDITY AND BODY MASS INDEX (BMI) OF 32.0 TO 32.9 IN ADULT: ICD-10-CM

## 2025-07-30 DIAGNOSIS — E66.811 CLASS 1 OBESITY DUE TO EXCESS CALORIES WITH SERIOUS COMORBIDITY AND BODY MASS INDEX (BMI) OF 32.0 TO 32.9 IN ADULT: ICD-10-CM

## 2025-07-30 DIAGNOSIS — Z12.31 VISIT FOR SCREENING MAMMOGRAM: ICD-10-CM

## 2025-07-30 PROCEDURE — 3079F DIAST BP 80-89 MM HG: CPT | Performed by: PHYSICIAN ASSISTANT

## 2025-07-30 PROCEDURE — 36415 COLL VENOUS BLD VENIPUNCTURE: CPT | Performed by: PHYSICIAN ASSISTANT

## 2025-07-30 PROCEDURE — 3075F SYST BP GE 130 - 139MM HG: CPT | Performed by: PHYSICIAN ASSISTANT

## 2025-07-30 PROCEDURE — 99214 OFFICE O/P EST MOD 30 MIN: CPT | Performed by: PHYSICIAN ASSISTANT

## 2025-07-30 PROCEDURE — 84443 ASSAY THYROID STIM HORMONE: CPT | Performed by: PHYSICIAN ASSISTANT

## 2025-07-30 PROCEDURE — 1126F AMNT PAIN NOTED NONE PRSNT: CPT | Performed by: PHYSICIAN ASSISTANT

## 2025-07-30 RX ORDER — BUSPIRONE HYDROCHLORIDE 10 MG/1
10 TABLET ORAL 3 TIMES DAILY
Qty: 90 TABLET | Refills: 5 | Status: SHIPPED | OUTPATIENT
Start: 2025-07-30

## 2025-07-30 RX ORDER — BUPROPION HYDROCHLORIDE 150 MG/1
150 TABLET ORAL EVERY MORNING
Qty: 30 TABLET | Refills: 1 | Status: SHIPPED | OUTPATIENT
Start: 2025-07-30

## 2025-07-30 ASSESSMENT — PATIENT HEALTH QUESTIONNAIRE - PHQ9
SUM OF ALL RESPONSES TO PHQ QUESTIONS 1-9: 12
10. IF YOU CHECKED OFF ANY PROBLEMS, HOW DIFFICULT HAVE THESE PROBLEMS MADE IT FOR YOU TO DO YOUR WORK, TAKE CARE OF THINGS AT HOME, OR GET ALONG WITH OTHER PEOPLE: SOMEWHAT DIFFICULT
SUM OF ALL RESPONSES TO PHQ QUESTIONS 1-9: 12

## 2025-07-30 ASSESSMENT — ANXIETY QUESTIONNAIRES
IF YOU CHECKED OFF ANY PROBLEMS ON THIS QUESTIONNAIRE, HOW DIFFICULT HAVE THESE PROBLEMS MADE IT FOR YOU TO DO YOUR WORK, TAKE CARE OF THINGS AT HOME, OR GET ALONG WITH OTHER PEOPLE: SOMEWHAT DIFFICULT
6. BECOMING EASILY ANNOYED OR IRRITABLE: MORE THAN HALF THE DAYS
5. BEING SO RESTLESS THAT IT IS HARD TO SIT STILL: NOT AT ALL
GAD7 TOTAL SCORE: 4
3. WORRYING TOO MUCH ABOUT DIFFERENT THINGS: NOT AT ALL
1. FEELING NERVOUS, ANXIOUS, OR ON EDGE: SEVERAL DAYS
8. IF YOU CHECKED OFF ANY PROBLEMS, HOW DIFFICULT HAVE THESE MADE IT FOR YOU TO DO YOUR WORK, TAKE CARE OF THINGS AT HOME, OR GET ALONG WITH OTHER PEOPLE?: SOMEWHAT DIFFICULT
4. TROUBLE RELAXING: SEVERAL DAYS
2. NOT BEING ABLE TO STOP OR CONTROL WORRYING: NOT AT ALL
GAD7 TOTAL SCORE: 4
7. FEELING AFRAID AS IF SOMETHING AWFUL MIGHT HAPPEN: NOT AT ALL
7. FEELING AFRAID AS IF SOMETHING AWFUL MIGHT HAPPEN: NOT AT ALL
GAD7 TOTAL SCORE: 4

## 2025-07-30 ASSESSMENT — PAIN SCALES - GENERAL: PAINLEVEL_OUTOF10: NO PAIN (0)

## 2025-07-30 NOTE — PROGRESS NOTES
"  Assessment & Plan     Adjustment disorder with mixed anxiety and depressed mood  Discussed that buspirone would be more effective if she took it daily, twice a day is ideal.   we will add bupropion to see if we can get improvement to her depressed mood as well.  Recheck in 4 to 6 weeks virtual or face-to-face is fine whichever she prefers  - busPIRone (BUSPAR) 10 MG tablet; Take 1 tablet (10 mg) by mouth 3 times daily.  - buPROPion (WELLBUTRIN XL) 150 MG 24 hr tablet; Take 1 tablet (150 mg) by mouth every morning.    Class 1 obesity due to excess calories with serious comorbidity and body mass index (BMI) of 32.0 to 32.9 in adult  We will rule out thyroid disease, she is going to work to increase exercise in her day and carefully lift weights if able she already has a healthy diet and will continue to eat well.  - TSH with free T4 reflex; Future  - TSH with free T4 reflex    Screening for diabetes mellitus  Screening for cardiovascular condition  Visit for screening mammogram  She is not due for a physical  - MA Screening Bilateral w/ [facility]; Future    BMI  Estimated body mass index is 32.57 kg/m  as calculated from the following:    Height as of this encounter: 1.607 m (5' 3.25\").    Weight as of this encounter: 84.1 kg (185 lb 5 oz).   Weight management plan: Discussed healthy diet and exercise guidelines    This chart documentation was completed in part with Dragon voice recognition software.  Documentation is reviewed after completion, however, some words and grammatical errors may remain.  Lyly Zayas PA-C      Figueroa Locke is a 44 year old, presenting for the following health issues:  Menopausal Sx        7/30/2025     2:48 PM   Additional Questions   Roomed by VE     History of Present Illness       Reason for visit:  Kelly & obisity  Symptom onset:  More than a month  Symptoms include:  Weight gain, no ana where i used to find ana.  Symptom intensity:  Severe  Symptom progression:  Staying the " same  Had these symptoms before:  No  What makes it worse:  Pms week  What makes it better:  Using SxS.      She has found that she has a lack of ana in her life.  Do things that she enjoys but does not have any lasting ana from them.  She has found them a lot but then to have this done.  Her anxiety has increased slightly.  She does not not have any isolation.  She wonders if she is perimenopausal.  Her menses are generally regular they are very light and sometimes she will have some pretty significant cramping.  She has always been heat intolerant and that is unchanged.  She is trying to lose weight but is really struggling to do so.  She had been going to [employer] for gym to exercise but she ended up with bilateral shoulder tendinitis.  She moves around at work quite a bit she gets 8-12,000 steps a day.  Her diet is already very good-her main source of protein is chicken she does like cheese but does not think she overeats it.  She avoids alcohol and sugary beverages.  She really limits red meat.  She does not eat fast food on a routine basis she does not eat processed carbs like chips or crackers.  Weight Management           7/30/2025   Initial Weight Management Questionnaire   I became overweight As a Teenager   My lowest weight since age 19 was (in lbs) 135   My highest weight since age 19 was (in lbs) 188   The following factors have contributed to my weight gain Stress / Mental Health Issues;Lack of Exercise;Genetic (Runs in the Family);Hormonal Changes (Pregnancy, Perimenopause/ Menopause);Perimenopause/ Menopause   I have tried the following methods to lose weight Exercise   I have the following symptoms associated with my weight  Joint Pain;Lack of Energy/Tired   With regards to my diet, I am struggling with the following Extra snacking between meals;Eating more than I wanted to;Eating when I am full   My current level of physical activity: Light exercise (less than 150 minutes per week)   With regards  to my activity/exercise, I am struggling with Pain / discomfort when I'm active;Feeling too tired to exercise;Lack of motivation           7/30/2025   Initial Weight Management Questionnaire   I became overweight As a Teenager   My lowest weight since age 19 was (in lbs) 135   My highest weight since age 19 was (in lbs) 188   The following factors have contributed to my weight gain Stress / Mental Health Issues;Lack of Exercise;Genetic (Runs in the Family);Hormonal Changes (Pregnancy, Perimenopause/ Menopause);Perimenopause/ Menopause   I have tried the following methods to lose weight Exercise   I have the following symptoms associated with my weight  Joint Pain;Lack of Energy/Tired   With regards to my diet, I am struggling with the following Extra snacking between meals;Eating more than I wanted to;Eating when I am full   My current level of physical activity: Light exercise (less than 150 minutes per week)   With regards to my activity/exercise, I am struggling with Pain / discomfort when I'm active;Feeling too tired to exercise;Lack of motivation         Primary Care Weight Management History           7/30/2025   Weight Management Vitals   Last Visits Weight  172   Waist Circumference (cm) 44 cm              No data to display                  Vitals:    07/30/25 1449   Weight: 84.1 kg (185 lb 5 oz)            7/30/2025   PHQ-2 Total Score   PHQ-2 Total Score 4        Patient-reported         7/30/2025   PHQ-9 Total Score   PHQ-9 Total Score 12        Patient-reported         7/30/2025   TYSHAWN-7 Total Score   GAD7 Total Score 4        Patient-reported     Lab work is in process  Labs reviewed in [facility]  BP Readings from Last 3 Encounters:   07/30/25 130/80   05/24/23 132/82   11/14/22 133/85    Wt Readings from Last 3 Encounters:   07/30/25 84.1 kg (185 lb 5 oz)   05/24/23 78 kg (172 lb)   11/14/22 79.4 kg (175 lb)                  Patient Active Problem List   Diagnosis    Hemorrhage of rectum and anus    Benign  neoplasm of skin of trunk, except scrotum    Pelvic pain in female    GERD (gastroesophageal reflux disease)    Insomnia    Hip impingement syndrome, left    Hip impingement syndrome, right    Chronic neck pain    Generalized anxiety disorder    Class 1 obesity due to excess calories with serious comorbidity and body mass index (BMI) of 32.0 to 32.9 in adult    Adjustment disorder with mixed anxiety and depressed mood     Past Surgical History:   Procedure Laterality Date    ABDOMEN SURGERY   &     . Spinal surgery    BACK SURGERY      ESOPHAGOSCOPY, GASTROSCOPY, DUODENOSCOPY (EGD), COMBINED  2011    Procedure:COMBINED ESOPHAGOSCOPY, GASTROSCOPY, DUODENOSCOPY (EGD), BIOPSY SINGLE OR MULTIPLE; multiple; Surgeon:JEFFRY BE; Location:[facility]    [facility]  DELIVERY ONLY      , Low Cervical       Social History     Tobacco Use    Smoking status: Never     Passive exposure: Never    Smokeless tobacco: Never   Substance Use Topics    Alcohol use: Yes     Comment: 1-2 days per month has 1-2 beers     Family History   Problem Relation Age of Onset    Obesity Mother     Gastrointestinal Disease Mother         Gallbladder    Diabetes Mother     Obesity Sister     Psychotic Disorder Sister         Multi personality, bipolar, suicidal, compulsive, anixety    Depression Sister     Gastrointestinal Disease Sister         gallbladder    Diabetes Father     Alcohol/Drug Father         alcohol    Depression Father     Breast Cancer Paternal Grandmother          age 70, unknown why    Eye Disorder Maternal Grandmother         macular degeneration    Depression Maternal Grandmother     Gynecology Maternal Grandmother         Hysterectomy - Tumors, cysts    Gastrointestinal Disease Maternal Grandmother         Gallbladder    C.A.D. Maternal Grandfather     Cardiovascular Maternal Grandfather     Heart Disease Maternal Grandfather         MI    Genitourinary Problems Paternal  "Grandfather         Kidney Failure    Cancer Paternal Grandfather     Neurologic Disorder Daughter         adhd    Musculoskeletal Disorder Paternal Aunt         fibromyalgias         Current Outpatient Medications   Medication Sig Dispense Refill    buPROPion (WELLBUTRIN XL) 150 MG 24 hr tablet Take 1 tablet (150 mg) by mouth every morning. 30 tablet 1    busPIRone (BUSPAR) 10 MG tablet Take 1 tablet (10 mg) by mouth 3 times daily. 90 tablet 5    ibuprofen (ADVIL/MOTRIN) 400 MG tablet Take 1 tablet (400 mg) by mouth every 8 hours as needed for moderate pain 30 tablet 0    cyclobenzaprine (FLEXERIL) 10 MG tablet Take 1 tablet (10 mg) by mouth nightly as needed for muscle spasms (Patient not taking: Reported on 7/30/2025) 90 tablet 1    traMADol (ULTRAM) 50 MG tablet Take 1 tablet (50 mg) by mouth every 6 hours as needed for pain maximum 2 tablet(s) per day (Patient not taking: Reported on 7/30/2025) 30 tablet 0     Allergies   Allergen Reactions    Amoxicillin Hives                      Review of Systems  Constitutional, HEENT, cardiovascular, pulmonary, gi and gu systems are negative, except as otherwise noted.      Objective    /80 (BP Location: Left arm, Patient Position: Chair, Cuff Size: Adult Regular)   Pulse 82   Temp 97.6  F (36.4  C) (Temporal)   Resp 16   Ht 1.607 m (5' 3.25\")   Wt 84.1 kg (185 lb 5 oz)   LMP 07/18/2025 (Exact Date)   SpO2 99%   Breastfeeding No   BMI 32.57 kg/m    Body mass index is 32.57 kg/m .  Physical Exam   GENERAL: alert and no distress  NECK: no adenopathy, no asymmetry, masses, or scars  RESP: lungs clear to auscultation - no rales, rhonchi or wheezes  CV: regular rate and rhythm, normal S1 S2, no S3 or S4, no murmur, click or rub, no peripheral edema  ABDOMEN: soft, nontender, no hepatosplenomegaly, no masses and bowel sounds normal  MS: no gross musculoskeletal defects noted, no edema  PSYCH: mentation appears normal, affect normal/bright    Orders Only on " 08/25/2020   Component Date Value Ref Range Status    FSH 08/25/2020 7.1  IU/L Final    Comment: FSH Reference Range  Female: Follicular      2.5-10.2          Mid-cycle       3.4-33.4          Luteal          1.5-9.1          Postmenopausal  23.0-116.3      Lutropin 08/25/2020 5.0  IU/L Final    Comment: LH Reference Range  Female: Follicular      1.9-12.5          Mid-cycle       8.7-76.3          Luteal          0.5-16.9          Postmenopausal  15.9-54.0       No results found for any visits on 07/30/25.        Signed Electronically by: Lyly Zayas PA-C

## 2025-07-31 ENCOUNTER — PATIENT OUTREACH (OUTPATIENT)
Dept: CARE COORDINATION | Facility: CLINIC | Age: 45
End: 2025-07-31
Payer: COMMERCIAL

## 2025-07-31 LAB — TSH SERPL DL<=0.005 MIU/L-ACNC: 2.25 UIU/ML (ref 0.3–4.2)

## 2025-08-22 DIAGNOSIS — F43.23 ADJUSTMENT DISORDER WITH MIXED ANXIETY AND DEPRESSED MOOD: ICD-10-CM

## 2025-08-25 RX ORDER — BUPROPION HYDROCHLORIDE 150 MG/1
150 TABLET ORAL EVERY MORNING
Qty: 90 TABLET | Refills: 0 | Status: SHIPPED | OUTPATIENT
Start: 2025-08-25

## 2025-09-03 ENCOUNTER — TRANSFERRED RECORDS (OUTPATIENT)
Dept: HEALTH INFORMATION MANAGEMENT | Facility: CLINIC | Age: 45
End: 2025-09-03
Payer: COMMERCIAL